# Patient Record
Sex: FEMALE | Race: WHITE | NOT HISPANIC OR LATINO | URBAN - METROPOLITAN AREA
[De-identification: names, ages, dates, MRNs, and addresses within clinical notes are randomized per-mention and may not be internally consistent; named-entity substitution may affect disease eponyms.]

---

## 2024-02-02 ENCOUNTER — INPATIENT (INPATIENT)
Facility: HOSPITAL | Age: 74
LOS: 4 days | Discharge: HOME | DRG: 313 | End: 2024-02-07
Attending: INTERNAL MEDICINE | Admitting: INTERNAL MEDICINE
Payer: MEDICARE

## 2024-02-02 VITALS
WEIGHT: 210.1 LBS | DIASTOLIC BLOOD PRESSURE: 78 MMHG | SYSTOLIC BLOOD PRESSURE: 134 MMHG | OXYGEN SATURATION: 97 % | RESPIRATION RATE: 20 BRPM | HEART RATE: 100 BPM | TEMPERATURE: 98 F

## 2024-02-02 DIAGNOSIS — R07.9 CHEST PAIN, UNSPECIFIED: ICD-10-CM

## 2024-02-02 DIAGNOSIS — D64.9 ANEMIA, UNSPECIFIED: ICD-10-CM

## 2024-02-02 DIAGNOSIS — I48.20 CHRONIC ATRIAL FIBRILLATION, UNSPECIFIED: ICD-10-CM

## 2024-02-02 DIAGNOSIS — E78.5 HYPERLIPIDEMIA, UNSPECIFIED: ICD-10-CM

## 2024-02-02 DIAGNOSIS — Z78.9 OTHER SPECIFIED HEALTH STATUS: ICD-10-CM

## 2024-02-02 LAB
ALBUMIN SERPL ELPH-MCNC: 4 G/DL — SIGNIFICANT CHANGE UP (ref 3.3–5)
ALP SERPL-CCNC: 91 U/L — SIGNIFICANT CHANGE UP (ref 40–120)
ALT FLD-CCNC: 38 U/L — SIGNIFICANT CHANGE UP (ref 10–45)
ANION GAP SERPL CALC-SCNC: 10 MMOL/L — SIGNIFICANT CHANGE UP (ref 5–17)
AST SERPL-CCNC: 65 U/L — HIGH (ref 10–40)
BASOPHILS # BLD AUTO: 0.05 K/UL — SIGNIFICANT CHANGE UP (ref 0–0.2)
BASOPHILS NFR BLD AUTO: 0.8 % — SIGNIFICANT CHANGE UP (ref 0–2)
BILIRUB SERPL-MCNC: 1 MG/DL — SIGNIFICANT CHANGE UP (ref 0.2–1.2)
BUN SERPL-MCNC: 9 MG/DL — SIGNIFICANT CHANGE UP (ref 7–23)
CALCIUM SERPL-MCNC: 9.4 MG/DL — SIGNIFICANT CHANGE UP (ref 8.4–10.5)
CHLORIDE SERPL-SCNC: 101 MMOL/L — SIGNIFICANT CHANGE UP (ref 96–108)
CO2 SERPL-SCNC: 23 MMOL/L — SIGNIFICANT CHANGE UP (ref 22–31)
CREAT SERPL-MCNC: 0.75 MG/DL — SIGNIFICANT CHANGE UP (ref 0.5–1.3)
D DIMER BLD IA.RAPID-MCNC: 266 NG/ML DDU — HIGH
EGFR: 83 ML/MIN/1.73M2 — SIGNIFICANT CHANGE UP
EOSINOPHIL # BLD AUTO: 0.22 K/UL — SIGNIFICANT CHANGE UP (ref 0–0.5)
EOSINOPHIL NFR BLD AUTO: 3.3 % — SIGNIFICANT CHANGE UP (ref 0–6)
ETHANOL SERPL-MCNC: <10 MG/DL — SIGNIFICANT CHANGE UP (ref 0–10)
GLUCOSE SERPL-MCNC: 109 MG/DL — HIGH (ref 70–99)
HCT VFR BLD CALC: 33.3 % — LOW (ref 34.5–45)
HGB BLD-MCNC: 11.2 G/DL — LOW (ref 11.5–15.5)
IMM GRANULOCYTES NFR BLD AUTO: 0.3 % — SIGNIFICANT CHANGE UP (ref 0–0.9)
LYMPHOCYTES # BLD AUTO: 0.78 K/UL — LOW (ref 1–3.3)
LYMPHOCYTES # BLD AUTO: 11.9 % — LOW (ref 13–44)
MAGNESIUM SERPL-MCNC: 1.8 MG/DL — SIGNIFICANT CHANGE UP (ref 1.6–2.6)
MCHC RBC-ENTMCNC: 33.6 GM/DL — SIGNIFICANT CHANGE UP (ref 32–36)
MCHC RBC-ENTMCNC: 33.8 PG — SIGNIFICANT CHANGE UP (ref 27–34)
MCV RBC AUTO: 100.6 FL — HIGH (ref 80–100)
MONOCYTES # BLD AUTO: 0.68 K/UL — SIGNIFICANT CHANGE UP (ref 0–0.9)
MONOCYTES NFR BLD AUTO: 10.3 % — SIGNIFICANT CHANGE UP (ref 2–14)
NEUTROPHILS # BLD AUTO: 4.83 K/UL — SIGNIFICANT CHANGE UP (ref 1.8–7.4)
NEUTROPHILS NFR BLD AUTO: 73.4 % — SIGNIFICANT CHANGE UP (ref 43–77)
NRBC # BLD: 0 /100 WBCS — SIGNIFICANT CHANGE UP (ref 0–0)
NT-PROBNP SERPL-SCNC: 1459 PG/ML — HIGH (ref 0–300)
PLATELET # BLD AUTO: 191 K/UL — SIGNIFICANT CHANGE UP (ref 150–400)
POTASSIUM SERPL-MCNC: 4.3 MMOL/L — SIGNIFICANT CHANGE UP (ref 3.5–5.3)
POTASSIUM SERPL-SCNC: 4.3 MMOL/L — SIGNIFICANT CHANGE UP (ref 3.5–5.3)
PROT SERPL-MCNC: 6.8 G/DL — SIGNIFICANT CHANGE UP (ref 6–8.3)
RBC # BLD: 3.31 M/UL — LOW (ref 3.8–5.2)
RBC # FLD: 13.5 % — SIGNIFICANT CHANGE UP (ref 10.3–14.5)
SODIUM SERPL-SCNC: 134 MMOL/L — LOW (ref 135–145)
TROPONIN T, HIGH SENSITIVITY RESULT: 6 NG/L — SIGNIFICANT CHANGE UP (ref 0–51)
TROPONIN T, HIGH SENSITIVITY RESULT: <6 NG/L — SIGNIFICANT CHANGE UP (ref 0–51)
WBC # BLD: 6.58 K/UL — SIGNIFICANT CHANGE UP (ref 3.8–10.5)
WBC # FLD AUTO: 6.58 K/UL — SIGNIFICANT CHANGE UP (ref 3.8–10.5)

## 2024-02-02 PROCEDURE — 93010 ELECTROCARDIOGRAM REPORT: CPT

## 2024-02-02 PROCEDURE — 71045 X-RAY EXAM CHEST 1 VIEW: CPT | Mod: 26

## 2024-02-02 PROCEDURE — 99285 EMERGENCY DEPT VISIT HI MDM: CPT

## 2024-02-02 RX ORDER — MESALAMINE 400 MG
1200 TABLET, DELAYED RELEASE (ENTERIC COATED) ORAL EVERY 12 HOURS
Refills: 0 | Status: DISCONTINUED | OUTPATIENT
Start: 2024-02-03 | End: 2024-02-07

## 2024-02-02 RX ORDER — APIXABAN 2.5 MG/1
5 TABLET, FILM COATED ORAL EVERY 12 HOURS
Refills: 0 | Status: DISCONTINUED | OUTPATIENT
Start: 2024-02-02 | End: 2024-02-03

## 2024-02-02 RX ORDER — GABAPENTIN 400 MG/1
300 CAPSULE ORAL THREE TIMES A DAY
Refills: 0 | Status: DISCONTINUED | OUTPATIENT
Start: 2024-02-02 | End: 2024-02-07

## 2024-02-02 RX ORDER — MESALAMINE 400 MG
2 TABLET, DELAYED RELEASE (ENTERIC COATED) ORAL
Refills: 0 | DISCHARGE

## 2024-02-02 RX ORDER — GABAPENTIN 400 MG/1
1 CAPSULE ORAL
Refills: 0 | DISCHARGE

## 2024-02-02 RX ORDER — LEVOTHYROXINE SODIUM 125 MCG
112 TABLET ORAL DAILY
Refills: 0 | Status: DISCONTINUED | OUTPATIENT
Start: 2024-02-02 | End: 2024-02-07

## 2024-02-02 RX ORDER — SUMATRIPTAN SUCCINATE 4 MG/.5ML
1 INJECTION, SOLUTION SUBCUTANEOUS
Refills: 0 | DISCHARGE

## 2024-02-02 RX ORDER — SUMATRIPTAN SUCCINATE 4 MG/.5ML
50 INJECTION, SOLUTION SUBCUTANEOUS DAILY
Refills: 0 | Status: DISCONTINUED | OUTPATIENT
Start: 2024-02-02 | End: 2024-02-07

## 2024-02-02 RX ORDER — ATORVASTATIN CALCIUM 80 MG/1
1 TABLET, FILM COATED ORAL
Refills: 0 | DISCHARGE

## 2024-02-02 RX ORDER — ZOLPIDEM TARTRATE 10 MG/1
1 TABLET ORAL
Refills: 0 | DISCHARGE

## 2024-02-02 RX ORDER — ASPIRIN/CALCIUM CARB/MAGNESIUM 324 MG
324 TABLET ORAL ONCE
Refills: 0 | Status: COMPLETED | OUTPATIENT
Start: 2024-02-02 | End: 2024-02-02

## 2024-02-02 RX ORDER — LEVOTHYROXINE SODIUM 125 MCG
1 TABLET ORAL
Refills: 0 | DISCHARGE

## 2024-02-02 RX ORDER — METOPROLOL TARTRATE 50 MG
25 TABLET ORAL DAILY
Refills: 0 | Status: DISCONTINUED | OUTPATIENT
Start: 2024-02-03 | End: 2024-02-03

## 2024-02-02 RX ORDER — ZOLPIDEM TARTRATE 10 MG/1
5 TABLET ORAL AT BEDTIME
Refills: 0 | Status: DISCONTINUED | OUTPATIENT
Start: 2024-02-02 | End: 2024-02-07

## 2024-02-02 RX ORDER — PANTOPRAZOLE SODIUM 20 MG/1
40 TABLET, DELAYED RELEASE ORAL
Refills: 0 | Status: DISCONTINUED | OUTPATIENT
Start: 2024-02-02 | End: 2024-02-07

## 2024-02-02 RX ORDER — BUPROPION HYDROCHLORIDE 150 MG/1
300 TABLET, EXTENDED RELEASE ORAL DAILY
Refills: 0 | Status: DISCONTINUED | OUTPATIENT
Start: 2024-02-02 | End: 2024-02-07

## 2024-02-02 RX ORDER — ATORVASTATIN CALCIUM 80 MG/1
40 TABLET, FILM COATED ORAL AT BEDTIME
Refills: 0 | Status: DISCONTINUED | OUTPATIENT
Start: 2024-02-02 | End: 2024-02-07

## 2024-02-02 RX ORDER — METOPROLOL TARTRATE 50 MG
25 TABLET ORAL ONCE
Refills: 0 | Status: COMPLETED | OUTPATIENT
Start: 2024-02-02 | End: 2024-02-02

## 2024-02-02 RX ORDER — CITALOPRAM 10 MG/1
40 TABLET, FILM COATED ORAL DAILY
Refills: 0 | Status: DISCONTINUED | OUTPATIENT
Start: 2024-02-02 | End: 2024-02-07

## 2024-02-02 RX ADMIN — Medication 25 MILLIGRAM(S): at 20:28

## 2024-02-02 RX ADMIN — Medication 324 MILLIGRAM(S): at 20:28

## 2024-02-02 NOTE — H&P ADULT - HISTORY OF PRESENT ILLNESS
73-year-old female patient with PMHx AUD, Leung's Esophagus, Colitis, Afib (on Eliquis), spinal stenosis, who presented to [facility] reporting two days of shortness of rbeath. Patient reports that she chronically experiences mild exertional SOB which has become progressively worse over the last two days and associated with exertional chest pressure [ qualify ] that is alleviated with rest. Patient is pain-free at this time. Patient denies CP, SOB, dizziness, palpitations, orthopnea/PND, leg swelling, LOC, bleeding, melena/hematochezia, fever, chills, URI symptoms, or recent illness.  73-year-old female patient with PMHx AUD, Leung's Esophagus, Colitis, Afib (on Eliquis), spinal stenosis, who presented to [facility] reporting two days of shortness of rbeath. Patient reports that she chronically experiences mild exertional SOB which has become progressively worse over the last two days and associated with exertional chest pressure [ qualify ] that is alleviated with rest. Patient is pain-free at this time. Patient denies CP, SOB, dizziness, palpitations, orthopnea/PND, leg swelling, LOC, bleeding, melena/hematochezia, fever, chills, URI symptoms, recent illness, recent ill contacts, recent prolonged travel,  73-year-old female patient with PMHx Alcohol Use Disorder (no hx hospitalizations/withdrawal), Leung's Esophagus, Colitis, Afib (on Eliquis), spinal stenosis, who presented to [facility] reporting two days of shortness of breath. Patient reports that she chronically experiences mild exertional SOB which has become progressively worse over the last two days and associated with exertional chest pressure [ qualify ] that is alleviated with rest. Patient is pain-free at this time. Patient denies CP, SOB, dizziness, palpitations, orthopnea/PND, leg swelling, LOC, bleeding, melena/hematochezia, fever, chills, URI symptoms, recent illness, recent ill contacts, recent prolonged travel, personal/family history of blood clots/clotting disorders, hx recent trauma/hospitalization/surgery, use of hormonal replacement therapy.    ED Course:   - Vital Signs: Temperature 97.9 F,  bpm, /78 mmHg, RR 20 breaths/min, spO2 97% on room air  - Labs: WBC 6.58, H/H 11.2/33.3, , Trops 6>6, Na 134, K 4.3, BUN/Cr 9/0.75, Glu 109, Mg 1.8, d-dimer 266  - Diagnostics: EKG (2/2/24): atrial fibrillation at 100 bpm; CXR (2/2/24): trace b/l pleural effusions versus thickening  - Interventions:  mg POx1, Lopressor 25 mg POx1    73-year-old female patient with PMHx Alcohol Use Disorder (no hx hospitalizations/withdrawal), Leung's Esophagus, Colitis, Afib (on Eliquis), spinal stenosis, who presented to [facility] reporting two days of shortness of breath. Patient reports that she chronically experiences mild exertional SOB. Two days ago she was walking her dog as usual when she began experiencing shortness of breath while walking her dog that resolved with rest. Patient reports that last night while walking her dog she again felt short of breath with associated midsternal, constant chest pressure lasting a few hours and resolving with rest. Patient reports that today symptoms had resolved but the episode concerned her, prompting her to report to ED for evaluation. Patient is pain-free at this time. Patient denies CP, SOB, dizziness, palpitations, orthopnea/PND, leg swelling, LOC, bleeding, melena/hematochezia, fever, chills, URI symptoms, recent illness, recent ill contacts, recent prolonged travel, personal/family history of blood clots/clotting disorders, hx recent trauma/hospitalization/surgery, use of hormonal replacement therapy.    ED Course:   - Vital Signs: Temperature 97.9 F,  bpm, /78 mmHg, RR 20 breaths/min, spO2 97% on room air  - Labs: WBC 6.58, H/H 11.2/33.3, , Trops 6>6, Na 134, K 4.3, BUN/Cr 9/0.75, Glu 109, Mg 1.8, d-dimer 266  - Diagnostics: EKG (2/2/24): atrial fibrillation at 100 bpm; CXR (2/2/24): trace b/l pleural effusions versus thickening  - Interventions:  mg POx1, Lopressor 25 mg POx1    73-year-old female patient with PMHx Alcohol Use Disorder (no hx hospitalizations/withdrawal), Leung's Esophagus, Ulcerative Colitis (on Mesalamine), Afib (on Eliquis), spinal stenosis, migraine headaches, who presented to [facility] reporting two days of shortness of breath. Patient reports that she chronically experiences mild exertional SOB. Two days ago she was walking her dog as usual when she began experiencing shortness of breath while walking her dog that resolved with rest. Patient reports that last night while walking her dog she again felt short of breath with associated midsternal, constant chest pressure lasting a few hours and resolving with rest. Patient reports that today symptoms had resolved but the episode concerned her, prompting her to report to ED for evaluation. Patient is pain-free at this time. Patient denies CP, SOB, dizziness, palpitations, orthopnea/PND, leg swelling, LOC, bleeding, melena/hematochezia, fever, chills, URI symptoms, recent illness, recent ill contacts, recent prolonged travel, personal/family history of blood clots/clotting disorders, hx recent trauma/hospitalization/surgery, use of hormonal replacement therapy.    ED Course:   - Vital Signs: Temperature 97.9 F,  bpm, /78 mmHg, RR 20 breaths/min, spO2 97% on room air  - Labs: WBC 6.58, H/H 11.2/33.3, , Trops 6>6, Na 134, K 4.3, BUN/Cr 9/0.75, Glu 109, Mg 1.8, d-dimer 266  - Diagnostics: EKG (2/2/24): atrial fibrillation at 100 bpm; CXR (2/2/24): trace b/l pleural effusions versus thickening  - Interventions:  mg POx1, Lopressor 25 mg POx1    73-year-old female patient with PMHx daily alcohol use (no hx hospitalizations/withdrawal), Leung's Esophagus, Colitis (on Mesalamine), Afib (on Eliquis), spinal stenosis, migraine headaches, who presented to [facility] reporting two days of shortness of breath. Patient reports that she chronically experiences mild exertional SOB. Two days ago she was walking her dog as usual when she began experiencing shortness of breath while walking her dog that resolved with rest. Patient reports that last night while walking her dog she again felt short of breath with associated midsternal, constant chest pressure lasting a few hours and resolving with rest. Patient reports that today symptoms had resolved but the episode concerned her, prompting her to report to ED for evaluation. Patient is pain-free at this time. Patient denies CP, SOB, dizziness, palpitations, orthopnea/PND, leg swelling, LOC, bleeding, melena/hematochezia, fever, chills, URI symptoms, recent illness, recent ill contacts, recent prolonged travel, personal/family history of blood clots/clotting disorders, hx recent trauma/hospitalization/surgery, use of hormonal replacement therapy.    ED Course:   - Vital Signs: Temperature 97.9 F,  bpm, /78 mmHg, RR 20 breaths/min, spO2 97% on room air  - Labs: WBC 6.58, H/H 11.2/33.3, , Trops 6>6, Na 134, K 4.3, BUN/Cr 9/0.75, Glu 109, Mg 1.8, d-dimer 266  - Diagnostics: EKG (2/2/24): atrial fibrillation at 100 bpm; CXR (2/2/24): trace b/l pleural effusions versus thickening  - Interventions:  mg POx1, Lopressor 25 mg POx1    73-year-old female patient with PMHx daily alcohol use (no hx hospitalizations/withdrawal), Leung's Esophagus, Colitis (on Mesalamine), Afib (on Eliquis), spinal stenosis, migraine headaches, who presented to [facility] reporting two days of shortness of breath. Two days ago she was walking her dog as usual when she began experiencing shortness of breath which resolved with rest. Patient reports that last night while walking her dog she again felt short of breath with associated midsternal, constant chest pressure that also resolved with rest. Patient reports that today symptoms had resolved but the episode concerned her, prompting her to report to ED for evaluation. Patient is pain-free at this time and feels as though "[she] could get up and walk out." Patient denies current CP, current SOB, dizziness, palpitations, orthopnea/PND, leg swelling, LOC, bleeding, melena/hematochezia, fever, chills, URI symptoms, recent illness, recent ill contacts, recent prolonged travel, personal/family history of blood clots/clotting disorders, hx recent trauma/hospitalization/surgery, use of hormonal replacement therapy, or family hx of early CAD/sudden cardiac death. Patient denies sedentary lifestyle, states that she walks dog three times per day.    ED Course:   - Vital Signs: Temperature 97.9 F,  bpm, /78 mmHg, RR 20 breaths/min, spO2 97% on room air  - Labs: WBC 6.58, H/H 11.2/33.3, , Trops 6>6, Na 134, K 4.3, BUN/Cr 9/0.75, Glu 109, Mg 1.8, d-dimer 266  - Diagnostics: EKG (2/2/24): atrial fibrillation at 100 bpm; CXR (2/2/24): trace b/l pleural effusions versus thickening  - Interventions:  mg POx1, Lopressor 25 mg POx1  73-year-old female patient with PMHx daily alcohol use (no hx hospitalizations/withdrawal), Leung's Esophagus, Colitis (on Mesalamine), Afib (on Eliquis), spinal stenosis, migraine headaches, anxiety/depression, who presented to [facility] reporting two days of shortness of breath. Two days ago she was walking her dog as usual when she began experiencing shortness of breath which resolved with rest. Patient reports that last night while walking her dog she again felt short of breath with associated midsternal, constant chest pressure that also resolved with rest. Patient reports that today symptoms had resolved but the episode concerned her, prompting her to report to ED for evaluation. Patient is pain-free at this time and feels as though "[she] could get up and walk out." Patient denies current CP, current SOB, dizziness, palpitations, orthopnea/PND, leg swelling, LOC, bleeding, melena/hematochezia, fever, chills, URI symptoms, recent illness, recent ill contacts, recent prolonged travel, personal/family history of blood clots/clotting disorders, hx recent trauma/hospitalization/surgery, use of hormonal replacement therapy, or family hx of early CAD/sudden cardiac death. Patient denies sedentary lifestyle, states that she walks dog three times per day.    ED Course:   - Vital Signs: Temperature 97.9 F,  bpm, /78 mmHg, RR 20 breaths/min, spO2 97% on room air  - Labs: WBC 6.58, H/H 11.2/33.3, , Trops 6>6, Na 134, K 4.3, BUN/Cr 9/0.75, Glu 109, Mg 1.8, d-dimer 266  - Diagnostics: EKG (2/2/24): atrial fibrillation at 100 bpm; CXR (2/2/24): trace b/l pleural effusions versus thickening  - Interventions:  mg POx1, Lopressor 25 mg POx1  73-year-old female patient with PMHx daily alcohol use (no hx hospitalizations/withdrawal), Leung's Esophagus, Colitis (on Mesalamine), Afib (on Eliquis), spinal stenosis, migraine headaches, anxiety/depression, who presented to [facility] reporting two days of shortness of breath. Two days ago she was walking her dog as usual when she began experiencing shortness of breath which resolved with rest. Patient reports that last night while walking her dog she again felt short of breath with associated midsternal, constant chest pressure that also resolved with rest. Patient reports that today symptoms had resolved but the episode concerned her, prompting her to report to ED for evaluation. Patient is pain-free at this time and feels as though "[she] could get up and walk out." Patient denies current CP, current SOB, dizziness, palpitations, orthopnea/PND, leg swelling, LOC, bleeding, melena/hematochezia, fever, chills, URI symptoms, recent illness, recent ill contacts, recent prolonged travel, personal/family history of blood clots/clotting disorders, hx recent trauma/hospitalization/surgery, use of hormonal replacement therapy, or family hx of early CAD/sudden cardiac death. Patient denies sedentary lifestyle, states that she walks dog three times per day.    ED Course:   - Vital Signs: Temperature 97.9 F,  bpm, /78 mmHg, RR 20 breaths/min, spO2 97% on room air  - Labs: WBC 6.58, H/H 11.2/33.3, , Trops 6>6, Na 134, K 4.3, BUN/Cr 9/0.75, Glu 109, Mg 1.8, d-dimer 266  - Diagnostics: EKG (2/2/24): atrial fibrillation at 100 bpm; CXR (2/2/24): trace b/l pleural effusions versus thickening  - Interventions:  mg POx1, Lopressor 25 mg POx1  73-year-old female patient with PMHx daily alcohol use (no hx hospitalizations/withdrawal), Leung's Esophagus, Colitis (on Mesalamine), Afib (on Eliquis), spinal stenosis, migraine headaches, anxiety/depression, anemia (unknown baseline hgb), who presented to [facility] reporting two days of shortness of breath. Two days ago she was walking her dog as usual when she began experiencing shortness of breath which resolved with rest. Patient reports that last night while walking her dog she again felt short of breath with associated midsternal, constant chest pressure that also resolved with rest. Patient reports that today symptoms had resolved but the episode concerned her, prompting her to report to ED for evaluation. Patient is pain-free at this time and feels as though "[she] could get up and walk out." Patient denies current CP, current SOB, dizziness, palpitations, orthopnea/PND, leg swelling, LOC, bleeding, melena/hematochezia, fever, chills, URI symptoms, recent illness, recent ill contacts, recent prolonged travel, personal/family history of blood clots/clotting disorders, hx recent trauma/hospitalization/surgery, use of hormonal replacement therapy, or family hx of early CAD/sudden cardiac death. Patient denies sedentary lifestyle, states that she walks dog three times per day.    ED Course:   - Vital Signs: Temperature 97.9 F,  bpm, /78 mmHg, RR 20 breaths/min, spO2 97% on room air  - Labs: WBC 6.58, H/H 11.2/33.3, , Trops 6>6, Na 134, K 4.3, BUN/Cr 9/0.75, Glu 109, Mg 1.8, d-dimer 266  - Diagnostics: EKG (2/2/24): atrial fibrillation at 100 bpm; CXR (2/2/24): trace b/l pleural effusions versus thickening  - Interventions:  mg POx1, Lopressor 25 mg POx1  73-year-old female patient with PMHx daily alcohol use (no hx hospitalizations/withdrawal), Leung's Esophagus, Colitis (on Mesalamine), Afib (on Eliquis), spinal stenosis, migraine headaches, anxiety/depression, anemia (unknown baseline hgb, required transfusion 2015), who presented to [facility] reporting two days of shortness of breath. Two days ago she was walking her dog as usual when she began experiencing shortness of breath which resolved with rest. Patient reports that last night while walking her dog she again felt short of breath with associated midsternal, constant chest pressure that also resolved with rest. Patient reports that today symptoms had resolved but the episode concerned her, prompting her to report to ED for evaluation. Patient is pain-free at this time and feels as though "[she] could get up and walk out." Patient denies current CP, current SOB, dizziness, palpitations, orthopnea/PND, leg swelling, LOC, bleeding, melena/hematochezia, fever, chills, URI symptoms, recent illness, recent ill contacts, recent prolonged travel, personal/family history of blood clots/clotting disorders, hx recent trauma/hospitalization/surgery, use of hormonal replacement therapy, or family hx of early CAD/sudden cardiac death. Patient denies sedentary lifestyle, states that she walks dog three times per day.    ED Course:   - Vital Signs: Temperature 97.9 F,  bpm, /78 mmHg, RR 20 breaths/min, spO2 97% on room air  - Labs: WBC 6.58, H/H 11.2/33.3, , Trops 6>6, Na 134, K 4.3, BUN/Cr 9/0.75, Glu 109, Mg 1.8, d-dimer 266  - Diagnostics: EKG (2/2/24): atrial fibrillation at 100 bpm; CXR (2/2/24): trace b/l pleural effusions versus thickening  - Interventions:  mg POx1, Lopressor 25 mg POx1  73-year-old female patient with PMHx daily alcohol use (no hx hospitalizations/withdrawal), Leung's Esophagus, Colitis (on Mesalamine), Afib (on Eliquis), spinal stenosis, migraine headaches, anxiety/depression, anemia (unknown baseline hgb, required transfusion 2015), who presented to [facility] reporting two days of shortness of breath. Two days ago she was walking her dog as usual when she began experiencing shortness of breath which resolved with rest. Patient reports that last night while walking her dog she again felt short of breath with associated midsternal, constant chest pressure that also resolved with rest. Patient reports that today symptoms had resolved but the episode concerned her, prompting her to report to ED for evaluation. Patient is pain-free at this time and feels as though "[she] could get up and walk out." Patient denies current CP, current SOB, dizziness, palpitations, orthopnea/PND, leg swelling, LOC, bleeding, melena/hematochezia, fever, chills, URI symptoms, recent illness, recent ill contacts, recent prolonged travel, personal/family history of blood clots/clotting disorders, hx recent trauma/hospitalization/surgery, use of hormonal replacement therapy, or family hx of early CAD/sudden cardiac death. Patient denies sedentary lifestyle, states that she walks dog three times per day. Follows with cardiologist regularly; stress test within last two months reportedly normal per pt.    ED Course:   - Vital Signs: Temperature 97.9 F,  bpm, /78 mmHg, RR 20 breaths/min, spO2 97% on room air  - Labs: WBC 6.58, H/H 11.2/33.3, , Trops 6>6, Na 134, K 4.3, BUN/Cr 9/0.75, Glu 109, Mg 1.8, d-dimer 266  - Diagnostics: EKG (2/2/24): atrial fibrillation at 100 bpm; CXR (2/2/24): trace b/l pleural effusions versus pleural thickening  - Interventions:  mg POx1, Lopressor 25 mg POx1

## 2024-02-02 NOTE — H&P ADULT - PROBLEM SELECTOR PLAN 3
H/H 11.2/33.3, previous admission w/ anemia requiring transfusion, denies bleeding including black or tarry stools/epistaxis/hematuria  - Pt reports last colonoscopy 4/2023, reportedly polyps found  - Follow up B12, folate, iron studies in AM   - Maintain active T&S H/H 11.2/33.3, previous admission w/ anemia requiring transfusion, denies bleeding including black or tarry stools/epistaxis/hematuria; pt reports last colonoscopy 4/2023, reportedly polyps found  - Follow up B12, folate, iron studies in AM   - Maintain active T&S H/H 11.2/33.3 (unknown baseline), previous admission w/ anemia requiring transfusion, denies bleeding including black or tarry stools/epistaxis/hematuria; pt reports last colonoscopy 4/2023, reportedly polyps found  - Follow up B12, folate, iron studies in AM   - Maintain active T&S

## 2024-02-02 NOTE — H&P ADULT - CARDIOVASCULAR COMMENTS
1+ pretibial pitting edema b/l lower extremities (chronic per pt) 2+ pretibial pitting edema b/l lower extremities (chronic per pt; b/l LE of equal size) 2+ pitting edema to mid-shin b/l lower extremities (chronic per pt; b/l LE of equal size)

## 2024-02-02 NOTE — H&P ADULT - PROBLEM SELECTOR PLAN 2
Patient reports hx Afib on Eliquis (endorses adherence), currently in AF in 100s  - Anticoagulation: Eliquis 5 mg BID  - Rate-control: ____Toprol XL 25 mg QD___ (s/p Lopressor 25 mg POx1 in ED) Patient reports hx Afib on Eliquis (endorses adherence), currently in AF in 100s  - Anticoagulation: Eliquis 5 mg BID   - Rate-control: Toprol XL 25 mg QD Patient reports hx Afib on Eliquis (endorses adherence), currently in AF in 70s  - Anticoagulation: Eliquis 5 mg BID   - Rate-control: Toprol XL 25 mg QD Patient reports recently dx with Afib on Eliquis (endorses adherence), currently in AF, HR 70s  - Anticoagulation: Eliquis 5 mg BID   - Rate-control: Toprol XL 25 mg QD

## 2024-02-02 NOTE — H&P ADULT - PROBLEM SELECTOR PLAN 7
- Continue: Sumatriptan 50 mg QD PRN     #Insomnia  - Continue: Zolpidem 10 mg QHS Pt reports hx spinal stenosis, 5/5 strength b/l LE, sensation grossly intact   - Continue: Gabapentin 300 mg TID   - HOLD: Naproxen 250 mg BID Pt reports hx colitis (unknown type)  - Continue: Mesalamine 1.2 g BID    #Leung's Esophagus   Pt denies black/tarry stools or further signs of bleeding  - Continue: pantoprazole 40 mg QD

## 2024-02-02 NOTE — H&P ADULT - PROBLEM SELECTOR PLAN 1
Patient p/w exertional SOB and CP x2 days, currently CP-free  - EKG (2/2/24): atrial fibrillation @100 bpm  - CXR (2/2/24): trace b/l effusions versus thickening  - Trops 6>6, pending CK/CKMB  - Continue: telemetry monitoring, continuous pulse oximetry, VS q4h Patient p/w exertional SOB and CP x2 days, currently CP-free  - EKG (2/2/24): atrial fibrillation @100 bpm  - CXR (2/2/24): trace b/l effusions versus pleural thickening  - Trops 6>6, follow up CK/CKMB  - Continue: telemetry monitoring, continuous pulse oximetry, VS q4h Patient p/w exertional SOB and CP x2 days, currently CP-free, VSS   - EKG (2/2/24): atrial fibrillation @100 bpm  - CXR (2/2/24): trace b/l effusions versus pleural thickening  - Trops 6>6, follow up CK/CKMB  - D-dimer 266; per [facility] Age-Adjusted cut-off pt low risk for VTE  - Continue: telemetry monitoring, continuous pulse oximetry, VS q4h Patient p/w exertional SOB and CP x2 days, currently CP-free, VSS   - EKG (2/2/24): atrial fibrillation @100 bpm  - CXR (2/2/24): trace b/l effusions versus pleural thickening  - Trops 6>6, follow up CK/CKMB  - D-dimer 266; per MD-Calc Age-Adjusted cut-off pt low risk for VTE  - Continue: telemetry monitoring, continuous pulse oximetry, VS q4h Patient p/w exertional SOB and CP x2 days, currently CP-free, VSS, w/ 2+ b/l LE edema   - EKG (2/2/24): atrial fibrillation @100 bpm  - CXR (2/2/24): trace b/l effusions versus pleural thickening  - Trops 6>6, follow up CK/CKMB, BNP 1459  - D-dimer 266; per MD-Calc Age-Adjusted cut-off pt low risk for VTE  - s/p Lasix 20 mg IVP x1 (lasix-naiive)  - Follow up: b/l LE US  - Continue: telemetry monitoring, continuous pulse oximetry, VS q4h, strict I&Os Patient p/w exertional SOB and CP x2 days, currently CP-free, VSS, w/ 2+ b/l LE edema   - EKG (2/2/24): atrial fibrillation @100 bpm  - CXR (2/2/24): trace b/l effusions versus pleural thickening  - Trops 6>6, CK/CKMB WNL, BNP 1459  - D-dimer 266; per MD-Calc Age-Adjusted cut-off pt low risk for VTE  - s/p Lasix 20 mg IVP x1 (lasix-naiive)  - Follow up: b/l LE US  - Continue: telemetry monitoring, continuous pulse oximetry, VS q4h, strict I&Os

## 2024-02-02 NOTE — H&P ADULT - PROBLEM SELECTOR PLAN 4
Follow up lipid panel in AM   - Continue: atorvastatin 40 mg QHS (_home med____)     DVT ppx: Eliquis 5 mg BID  F: tolerating PO  E: Keep K > 4, Mg > 2  N: DASH/TLC w/ CC     Code: Full  Dispo: pending clinical progression Follow up lipid panel in AM   - Continue: Atorvastatin 40 mg QHS     DVT ppx: Eliquis 5 mg BID  F: tolerating PO  E: Keep K > 4, Mg > 2  N: DASH/TLC w/ CC     Code: Full  Dispo: pending clinical progression Pt states drinks appx 3 glasses of wine/day, last drink > 24 hours ago, denies hx hospitalizations/withdrawal   - CIWA protocol Pt states drinks appx 3 glasses of wine/day, last drink > 24 hours ago, denies hx hospitalizations/withdrawal   - CIWA protocol (CIWA Score = 2 on admission)

## 2024-02-02 NOTE — ED ADULT NURSE NOTE - OBJECTIVE STATEMENT
Pt c/o chest pressure and SOB worse with exertion. pt denies any cough, CP, N/V/D, fever, or chills.

## 2024-02-02 NOTE — ED PROVIDER NOTE - NSICDXPASTMEDICALHX_GEN_ALL_CORE_FT
PAST MEDICAL HISTORY:  Alcohol abuse     Anxiety     Leung esophagus     Chronic atrial fibrillation     Colitis     Depression

## 2024-02-02 NOTE — H&P ADULT - NSHPLABSRESULTS_GEN_ALL_CORE
LABS:                          11.2   6.58  )-----------( 191      ( 02 Feb 2024 16:27 )             33.3     02-02    134<L>  |  101  |  9   ----------------------------<  109<H>  4.3   |  23  |  0.75    Ca    9.4      02 Feb 2024 16:27  Mg     1.8     02-02    TPro  6.8  /  Alb  4.0  /  TBili  1.0  /  DBili  x   /  AST  65<H>  /  ALT  38  /  AlkPhos  91  02-02    LIVER FUNCTIONS - ( 02 Feb 2024 16:27 )  Alb: 4.0 g/dL / Pro: 6.8 g/dL / ALK PHOS: 91 U/L / ALT: 38 U/L / AST: 65 U/L / GGT: x             Urinalysis Basic - ( 02 Feb 2024 16:27 )    Color: x / Appearance: x / SG: x / pH: x  Gluc: 109 mg/dL / Ketone: x  / Bili: x / Urobili: x   Blood: x / Protein: x / Nitrite: x   Leuk Esterase: x / RBC: x / WBC x   Sq Epi: x / Non Sq Epi: x / Bacteria: x

## 2024-02-02 NOTE — ED ADULT NURSE NOTE - NSFALLUNIVINTERV_ED_ALL_ED
Bed/Stretcher in lowest position, wheels locked, appropriate side rails in place/Call bell, personal items and telephone in reach/Instruct patient to call for assistance before getting out of bed/chair/stretcher/Non-slip footwear applied when patient is off stretcher/[place] to call system/Physically safe environment - no spills, clutter or unnecessary equipment/Purposeful proactive rounding/Room/bathroom lighting operational, light cord in reach

## 2024-02-02 NOTE — H&P ADULT - PROBLEM SELECTOR PLAN 10
Follow up lipid panel in AM   - Continue: Atorvastatin 40 mg QHS     DVT ppx: Eliquis 5 mg BID  F: tolerating PO  E: Keep K > 4, Mg > 2  N: DASH/TLC w/ CC     Code: Full  Dispo: pending clinical progression

## 2024-02-02 NOTE — PATIENT PROFILE ADULT - FALL HARM RISK - HARM RISK INTERVENTIONS
Assistance with ambulation/Assistance OOB with selected safe patient handling equipment/Communicate Risk of Fall with Harm to all staff/Monitor for mental status changes/Monitor gait and stability/Reinforce activity limits and safety measures with patient and family/Tailored Fall Risk Interventions/Toileting schedule using arm’s reach rule for commode and bathroom/Use of alarms - bed, chair and/or voice tab/Visual Cue: Yellow wristband and red socks/Bed in lowest position, wheels locked, appropriate side rails in place/Call bell, personal items and telephone in reach/Instruct patient to call for assistance before getting out of bed or chair/Non-slip footwear when patient is out of bed/[place] to call system/Physically safe environment - no spills, clutter or unnecessary equipment/Purposeful Proactive Rounding/Room/bathroom lighting operational, light cord in reach

## 2024-02-02 NOTE — ED PROVIDER NOTE - ATTENDING APP SHARED VISIT CONTRIBUTION OF CARE
73-year-old with multiple medical problems coming in with acute on chronic shortness of breath with what she describes as profound exertional shortness of breath today with associated tightness and pressure on chest with exertion, this is relieved with rest although earlier was feeling a bit short of breath at rest as well which was new, no known lung problems, no smoking, no history of blood clot, no leg pain or swelling, no recent infections symptoms, no cough, no fevers, EKG is A-fib at 100, x-ray with no effusion, no infiltrate, patient with no PE risk factors and on Eliquis doubt PE, exertional chest pressure and shortness of breath concerning for possible ACS, will plan to admit to cardiology for further evaluation.  PE  lung cbta , irreg / no murmer , no acute distress, no edema / no calf tenderness

## 2024-02-02 NOTE — ED ADULT NURSE NOTE - NS ED NURSE DISCH DISPOSITION
Admitted H Plasty Text: Given the location of the defect, shape of the defect and the proximity to free margins a H-plasty was deemed most appropriate for repair.  Using a sterile surgical marker, the appropriate advancement arms of the H-plasty were drawn incorporating the defect and placing the expected incisions within the relaxed skin tension lines where possible. The area thus outlined was incised deep to adipose tissue with a #15 scalpel blade. The skin margins were undermined to an appropriate distance in all directions utilizing iris scissors.  The opposing advancement arms were then advanced into place in opposite direction and anchored with interrupted buried subcutaneous sutures.

## 2024-02-02 NOTE — H&P ADULT - PROBLEM SELECTOR PLAN 9
Maria A Sanderson was admitted to [facility] room 26 from ED via cart.   Reason for hospitalization is abdominal pain, N/V, constipation, low electrolytes. Upon arrival, patient is stable. Patient has history significant for Gerd, gastritis, SVT, DVT, HF, hernia repair.  Patient oriented to bed, call light and .  Patient provided with the following educational materials upon admission:safety and pain.   Level of understanding patient verbalized understanding.   Admission orders received at this time. See Epic documentation for patient individualized nursing care plan. Dual skin done with FATMATA Pineda.    Follow up lipid panel in AM   - Continue: Atorvastatin 40 mg QHS     DVT ppx: Eliquis 5 mg BID  F: tolerating PO  E: Keep K > 4, Mg > 2  N: DASH/TLC w/ CC     Code: Full  Dispo: pending clinical progression - Continue: Sumatriptan 50 mg QD PRN     #Insomnia  - Continue: Zolpidem 10 mg QHS

## 2024-02-02 NOTE — H&P ADULT - NS ATTEND AMEND GEN_ALL_CORE FT
73-year-old female patient with PMHx daily alcohol use (no hx hospitalizations/withdrawal), Leung's Esophagus, Colitis (on Mesalamine), Afib (on Eliquis), spinal stenosis, migraine headaches, anxiety/depression, anemia (unknown baseline hgb, required transfusion 2015), with two days of exertional SOB/chest pressure, admitted to cardiac telemetry to r/o ACS.   CP free at present    1. CP    Hx as above  atypical, likely related to GI  Trop negative Ddimer low end of profile for age   recent stress test wnl  Will obtain CCTA

## 2024-02-02 NOTE — ED PROVIDER NOTE - CLINICAL SUMMARY MEDICAL DECISION MAKING FREE TEXT BOX
74 y/o F pmhx afib, colitis, barrets esophogus, AUD comes in with 48 hours of shortness of breath. Patient denies any cough, fever, chills, chest pain, jaw or arm pain, or back pain. Patient has increased shortness of breath from baseline. Vitals stable /78, , Temp 97.9 SPO2 97. CBC shows anemia- hemoglobin 11.4. 74 y/o F pmhx afib on eloquis but no rate control, colitis, barrets esophogus, AUD comes in with 48 hours of shortness of breath. Patient denies any cough, fever, chills, chest pain, jaw or arm pain, or back pain. Patient has increased shortness of breath and increased pressure from baseline. Vitals stable /78, , Temp 97.9 SPO2 97. CBC shows slight anemia, EKG shows Afib. Patient to be admitted to [facility] for further workup 72 y/o F pmhx afib on eloquis but no rate control, colitis, barrets esophogus,  comes in with 48 hours of worsening shortness of breath and new exertional chest pressure. Patient denies any cough, fever, chills, chest pain, jaw or arm pain, or back pain. 74 y/o F pm-hx afib on eloquis but no rate control, colitis, barrets esophogus,  comes in with 48 hours of worsening shortness of breath and new exertional chest pressure. Patient denies any cough, fever, chills, chest pain, jaw or arm pain, or back pain.

## 2024-02-02 NOTE — H&P ADULT - NSICDXPASTMEDICALHX_GEN_ALL_CORE_FT
PAST MEDICAL HISTORY:  Alcohol abuse     Anxiety     Leung esophagus     Chronic atrial fibrillation     Colitis     Depression      PAST MEDICAL HISTORY:  Alcohol use     Anxiety     Leung esophagus     Chronic atrial fibrillation     Colitis     Depression     History of migraine headaches     Insomnia, unspecified

## 2024-02-02 NOTE — H&P ADULT - PROBLEM SELECTOR PLAN 8
Follow up lipid panel in AM   - Continue: Atorvastatin 40 mg QHS     DVT ppx: Eliquis 5 mg BID  F: tolerating PO  E: Keep K > 4, Mg > 2  N: DASH/TLC w/ CC     Code: Full  Dispo: pending clinical progression - Continue: Sumatriptan 50 mg QD PRN     #Insomnia  - Continue: Zolpidem 10 mg QHS Pt reports hx spinal stenosis, 5/5 strength b/l LE, sensation grossly intact   - Continue: Gabapentin 300 mg TID   - HOLD: Naproxen 250 mg BID

## 2024-02-02 NOTE — H&P ADULT - PROBLEM SELECTOR PROBLEM 9

## 2024-02-02 NOTE — H&P ADULT - PROBLEM SELECTOR PLAN 6
Pt reports hx colitis; unsure which but does not believe it is UC   - Continue: Mesalamine 1.2 g BID    #Leung's Esophagus   Pt denies black/tarry stools or further signs of bleeding   - Continue: pantoprazole 40 mg QD Pt reports hx colitis (unknown type)  - Continue: Mesalamine 1.2 g BID    #Leung's Esophagus   Pt denies black/tarry stools or further signs of bleeding  - Continue: pantoprazole 40 mg QD - Continue: Citalopram 40 mg QD and Bupropion 300 mg QD

## 2024-02-02 NOTE — H&P ADULT - ASSESSMENT
73-year-old female patient with PMHx Alcohol Use Disorder (no hx hospitalizations/withdrawal), Leung's Esophagus, Colitis, Afib (on Eliquis), spinal stenosis, with two days of exertional SOB/chest pressure, admitted to cardiac telemetry to r/o ACS. 73-year-old female patient with PMHx daily alcohol use (no hx hospitalizations/withdrawal), Leung's Esophagus, Colitis (on Mesalamine), Afib (on Eliquis), spinal stenosis, migraine headaches, anxiety/depression, anemia (unknown baseline hgb), with two days of exertional SOB/chest pressure, admitted to cardiac telemetry to r/o ACS.  73-year-old female patient with PMHx daily alcohol use (no hx hospitalizations/withdrawal), Leung's Esophagus, Colitis (on Mesalamine), Afib (on Eliquis), spinal stenosis, migraine headaches, anxiety/depression, anemia (unknown baseline hgb, required transfusion 2015), with two days of exertional SOB/chest pressure, admitted to cardiac telemetry to r/o ACS.

## 2024-02-02 NOTE — H&P ADULT - CARDIOVASCULAR
normal/regular rate and rhythm/S1 S2 present/no gallops/no rub/no murmur/no JVD/no pedal edema/vascular normal/regular rate and rhythm/S1 S2 present/no gallops/no rub/no murmur/no JVD/vascular S1 S2 present/no gallops/no rub/no murmur/no JVD/Irregularly irregular rhythm/vascular

## 2024-02-02 NOTE — H&P ADULT - SOCIAL HISTORY: ALCOHOL USE
3 glasses of wine/night 3 glasses of wine/night, no hx hospitalizations/withdrawal, last drink approximately 24 hours ago (2/1/24 at 11PM)

## 2024-02-02 NOTE — ED PROVIDER NOTE - PHYSICAL EXAMINATION
T(C): 36.6 (02-02-24 @ 15:30), Max: 36.6 (02-02-24 @ 15:30)  HR: 100 (02-02-24 @ 15:30) (100 - 100)  BP: 134/78 (02-02-24 @ 15:30) (134/78 - 134/78)  RR: 20 (02-02-24 @ 15:30) (20 - 20)  SpO2: 97% (02-02-24 @ 15:30) (97% - 97%)    CONSTITUTIONAL:  no apparent distress  EYES:  non-icteric, no pallor  ENMT: Oral mucosa with moist membranes.   RESP: No respiratory distress, no use of accessory muscles; CTA b/l, no WRR  CV: RRR, +S1S2, no MRG; no JVD; no peripheral edema  GI: Soft, NT, ND, no rebound, no guarding;

## 2024-02-02 NOTE — H&P ADULT - PROBLEM SELECTOR PLAN 5
Follow up lipid panel in AM   - Continue: Atorvastatin 40 mg QHS     DVT ppx: Eliquis 5 mg BID  F: tolerating PO  E: Keep K > 4, Mg > 2  N: DASH/TLC w/ CC     Code: Full  Dispo: pending clinical progression - Continue: Citalopram 40 mg QD and Bupropion 300 mg QD Follow up TSH in AM  - Continue: Levothyroxine 112 mcg QD

## 2024-02-03 DIAGNOSIS — E03.9 HYPOTHYROIDISM, UNSPECIFIED: ICD-10-CM

## 2024-02-03 DIAGNOSIS — K52.9 NONINFECTIVE GASTROENTERITIS AND COLITIS, UNSPECIFIED: ICD-10-CM

## 2024-02-03 DIAGNOSIS — Z86.69 PERSONAL HISTORY OF OTHER DISEASES OF THE NERVOUS SYSTEM AND SENSE ORGANS: ICD-10-CM

## 2024-02-03 DIAGNOSIS — F32.A DEPRESSION, UNSPECIFIED: ICD-10-CM

## 2024-02-03 DIAGNOSIS — M48.00 SPINAL STENOSIS, SITE UNSPECIFIED: ICD-10-CM

## 2024-02-03 LAB
A1C WITH ESTIMATED AVERAGE GLUCOSE RESULT: 5.7 % — HIGH (ref 4–5.6)
ALBUMIN SERPL ELPH-MCNC: 3.8 G/DL — SIGNIFICANT CHANGE UP (ref 3.3–5)
ALP SERPL-CCNC: 83 U/L — SIGNIFICANT CHANGE UP (ref 40–120)
ALT FLD-CCNC: 32 U/L — SIGNIFICANT CHANGE UP (ref 10–45)
ANION GAP SERPL CALC-SCNC: 10 MMOL/L — SIGNIFICANT CHANGE UP (ref 5–17)
APTT BLD: 139 SEC — CRITICAL HIGH (ref 24.5–35.6)
APTT BLD: 41.3 SEC — HIGH (ref 24.5–35.6)
AST SERPL-CCNC: 52 U/L — HIGH (ref 10–40)
BASOPHILS # BLD AUTO: 0.04 K/UL — SIGNIFICANT CHANGE UP (ref 0–0.2)
BASOPHILS NFR BLD AUTO: 0.8 % — SIGNIFICANT CHANGE UP (ref 0–2)
BILIRUB DIRECT SERPL-MCNC: 0.3 MG/DL — SIGNIFICANT CHANGE UP (ref 0–0.3)
BILIRUB INDIRECT FLD-MCNC: 0.5 MG/DL — SIGNIFICANT CHANGE UP (ref 0.2–1)
BILIRUB SERPL-MCNC: 0.8 MG/DL — SIGNIFICANT CHANGE UP (ref 0.2–1.2)
BLD GP AB SCN SERPL QL: NEGATIVE — SIGNIFICANT CHANGE UP
BUN SERPL-MCNC: 10 MG/DL — SIGNIFICANT CHANGE UP (ref 7–23)
CALCIUM SERPL-MCNC: 8.9 MG/DL — SIGNIFICANT CHANGE UP (ref 8.4–10.5)
CHLORIDE SERPL-SCNC: 102 MMOL/L — SIGNIFICANT CHANGE UP (ref 96–108)
CHOLEST SERPL-MCNC: 107 MG/DL — SIGNIFICANT CHANGE UP
CK MB CFR SERPL CALC: 3 NG/ML — SIGNIFICANT CHANGE UP (ref 0–6.7)
CK SERPL-CCNC: 127 U/L — SIGNIFICANT CHANGE UP (ref 25–170)
CO2 SERPL-SCNC: 24 MMOL/L — SIGNIFICANT CHANGE UP (ref 22–31)
CREAT SERPL-MCNC: 0.72 MG/DL — SIGNIFICANT CHANGE UP (ref 0.5–1.3)
EGFR: 88 ML/MIN/1.73M2 — SIGNIFICANT CHANGE UP
EOSINOPHIL # BLD AUTO: 0.31 K/UL — SIGNIFICANT CHANGE UP (ref 0–0.5)
EOSINOPHIL NFR BLD AUTO: 6.2 % — HIGH (ref 0–6)
ESTIMATED AVERAGE GLUCOSE: 117 MG/DL — HIGH (ref 68–114)
FERRITIN SERPL-MCNC: 471 NG/ML — HIGH (ref 13–330)
FOLATE SERPL-MCNC: >20 NG/ML — SIGNIFICANT CHANGE UP
GLUCOSE SERPL-MCNC: 108 MG/DL — HIGH (ref 70–99)
HCT VFR BLD CALC: 32.3 % — LOW (ref 34.5–45)
HCT VFR BLD CALC: 33.8 % — LOW (ref 34.5–45)
HCV AB S/CO SERPL IA: 0.04 S/CO — SIGNIFICANT CHANGE UP
HCV AB SERPL-IMP: SIGNIFICANT CHANGE UP
HDLC SERPL-MCNC: 41 MG/DL — LOW
HGB BLD-MCNC: 10.6 G/DL — LOW (ref 11.5–15.5)
HGB BLD-MCNC: 11.7 G/DL — SIGNIFICANT CHANGE UP (ref 11.5–15.5)
IMM GRANULOCYTES NFR BLD AUTO: 0.4 % — SIGNIFICANT CHANGE UP (ref 0–0.9)
INR BLD: 1.99 — HIGH (ref 0.85–1.18)
IRON SATN MFR SERPL: 35 % — SIGNIFICANT CHANGE UP (ref 14–50)
IRON SATN MFR SERPL: 93 UG/DL — SIGNIFICANT CHANGE UP (ref 30–160)
LIPID PNL WITH DIRECT LDL SERPL: 52 MG/DL — SIGNIFICANT CHANGE UP
LYMPHOCYTES # BLD AUTO: 0.96 K/UL — LOW (ref 1–3.3)
LYMPHOCYTES # BLD AUTO: 19.2 % — SIGNIFICANT CHANGE UP (ref 13–44)
MAGNESIUM SERPL-MCNC: 1.9 MG/DL — SIGNIFICANT CHANGE UP (ref 1.6–2.6)
MCHC RBC-ENTMCNC: 32.8 GM/DL — SIGNIFICANT CHANGE UP (ref 32–36)
MCHC RBC-ENTMCNC: 33.9 PG — SIGNIFICANT CHANGE UP (ref 27–34)
MCHC RBC-ENTMCNC: 34.6 GM/DL — SIGNIFICANT CHANGE UP (ref 32–36)
MCHC RBC-ENTMCNC: 34.9 PG — HIGH (ref 27–34)
MCV RBC AUTO: 100.9 FL — HIGH (ref 80–100)
MCV RBC AUTO: 103.2 FL — HIGH (ref 80–100)
MONOCYTES # BLD AUTO: 0.56 K/UL — SIGNIFICANT CHANGE UP (ref 0–0.9)
MONOCYTES NFR BLD AUTO: 11.2 % — SIGNIFICANT CHANGE UP (ref 2–14)
NEUTROPHILS # BLD AUTO: 3.12 K/UL — SIGNIFICANT CHANGE UP (ref 1.8–7.4)
NEUTROPHILS NFR BLD AUTO: 62.2 % — SIGNIFICANT CHANGE UP (ref 43–77)
NON HDL CHOLESTEROL: 66 MG/DL — SIGNIFICANT CHANGE UP
NRBC # BLD: 0 /100 WBCS — SIGNIFICANT CHANGE UP (ref 0–0)
NRBC # BLD: 0 /100 WBCS — SIGNIFICANT CHANGE UP (ref 0–0)
PLATELET # BLD AUTO: 158 K/UL — SIGNIFICANT CHANGE UP (ref 150–400)
PLATELET # BLD AUTO: 212 K/UL — SIGNIFICANT CHANGE UP (ref 150–400)
POTASSIUM SERPL-MCNC: 3.4 MMOL/L — LOW (ref 3.5–5.3)
POTASSIUM SERPL-SCNC: 3.4 MMOL/L — LOW (ref 3.5–5.3)
PROT SERPL-MCNC: 6.5 G/DL — SIGNIFICANT CHANGE UP (ref 6–8.3)
PROTHROM AB SERPL-ACNC: 22.2 SEC — HIGH (ref 9.5–13)
RBC # BLD: 3.13 M/UL — LOW (ref 3.8–5.2)
RBC # BLD: 3.35 M/UL — LOW (ref 3.8–5.2)
RBC # FLD: 13.4 % — SIGNIFICANT CHANGE UP (ref 10.3–14.5)
RBC # FLD: 13.7 % — SIGNIFICANT CHANGE UP (ref 10.3–14.5)
RH IG SCN BLD-IMP: POSITIVE — SIGNIFICANT CHANGE UP
SODIUM SERPL-SCNC: 136 MMOL/L — SIGNIFICANT CHANGE UP (ref 135–145)
TIBC SERPL-MCNC: 266 UG/DL — SIGNIFICANT CHANGE UP (ref 220–430)
TRANSFERRIN SERPL-MCNC: 224 MG/DL — SIGNIFICANT CHANGE UP (ref 200–360)
TRIGL SERPL-MCNC: 72 MG/DL — SIGNIFICANT CHANGE UP
TSH SERPL-MCNC: 3.72 UIU/ML — SIGNIFICANT CHANGE UP (ref 0.27–4.2)
TSH SERPL-MCNC: 3.76 UIU/ML — SIGNIFICANT CHANGE UP (ref 0.27–4.2)
UIBC SERPL-MCNC: 173 UG/DL — SIGNIFICANT CHANGE UP (ref 110–370)
VIT B12 SERPL-MCNC: 824 PG/ML — SIGNIFICANT CHANGE UP (ref 232–1245)
WBC # BLD: 5.01 K/UL — SIGNIFICANT CHANGE UP (ref 3.8–10.5)
WBC # BLD: 7.07 K/UL — SIGNIFICANT CHANGE UP (ref 3.8–10.5)
WBC # FLD AUTO: 5.01 K/UL — SIGNIFICANT CHANGE UP (ref 3.8–10.5)
WBC # FLD AUTO: 7.07 K/UL — SIGNIFICANT CHANGE UP (ref 3.8–10.5)

## 2024-02-03 PROCEDURE — 93306 TTE W/DOPPLER COMPLETE: CPT | Mod: 26

## 2024-02-03 PROCEDURE — 93010 ELECTROCARDIOGRAM REPORT: CPT

## 2024-02-03 RX ORDER — HEPARIN SODIUM 5000 [USP'U]/ML
INJECTION INTRAVENOUS; SUBCUTANEOUS
Qty: 25000 | Refills: 0 | Status: DISCONTINUED | OUTPATIENT
Start: 2024-02-03 | End: 2024-02-06

## 2024-02-03 RX ORDER — HEPARIN SODIUM 5000 [USP'U]/ML
3500 INJECTION INTRAVENOUS; SUBCUTANEOUS EVERY 6 HOURS
Refills: 0 | Status: DISCONTINUED | OUTPATIENT
Start: 2024-02-03 | End: 2024-02-06

## 2024-02-03 RX ORDER — FUROSEMIDE 40 MG
20 TABLET ORAL ONCE
Refills: 0 | Status: COMPLETED | OUTPATIENT
Start: 2024-02-03 | End: 2024-02-03

## 2024-02-03 RX ORDER — METOPROLOL TARTRATE 50 MG
50 TABLET ORAL
Refills: 0 | Status: DISCONTINUED | OUTPATIENT
Start: 2024-02-03 | End: 2024-02-06

## 2024-02-03 RX ORDER — HEPARIN SODIUM 5000 [USP'U]/ML
7500 INJECTION INTRAVENOUS; SUBCUTANEOUS EVERY 6 HOURS
Refills: 0 | Status: DISCONTINUED | OUTPATIENT
Start: 2024-02-03 | End: 2024-02-06

## 2024-02-03 RX ORDER — POTASSIUM CHLORIDE 20 MEQ
40 PACKET (EA) ORAL EVERY 4 HOURS
Refills: 0 | Status: COMPLETED | OUTPATIENT
Start: 2024-02-03 | End: 2024-02-03

## 2024-02-03 RX ORDER — ASPIRIN/CALCIUM CARB/MAGNESIUM 324 MG
81 TABLET ORAL DAILY
Refills: 0 | Status: DISCONTINUED | OUTPATIENT
Start: 2024-02-04 | End: 2024-02-07

## 2024-02-03 RX ADMIN — HEPARIN SODIUM 1700 UNIT(S)/HR: 5000 INJECTION INTRAVENOUS; SUBCUTANEOUS at 20:11

## 2024-02-03 RX ADMIN — Medication 1200 MILLIGRAM(S): at 17:24

## 2024-02-03 RX ADMIN — Medication 1200 MILLIGRAM(S): at 07:49

## 2024-02-03 RX ADMIN — APIXABAN 5 MILLIGRAM(S): 2.5 TABLET, FILM COATED ORAL at 07:11

## 2024-02-03 RX ADMIN — Medication 25 MILLIGRAM(S): at 07:11

## 2024-02-03 RX ADMIN — HEPARIN SODIUM 1700 UNIT(S)/HR: 5000 INJECTION INTRAVENOUS; SUBCUTANEOUS at 15:38

## 2024-02-03 RX ADMIN — GABAPENTIN 300 MILLIGRAM(S): 400 CAPSULE ORAL at 14:21

## 2024-02-03 RX ADMIN — Medication 20 MILLIGRAM(S): at 01:42

## 2024-02-03 RX ADMIN — ZOLPIDEM TARTRATE 5 MILLIGRAM(S): 10 TABLET ORAL at 22:34

## 2024-02-03 RX ADMIN — GABAPENTIN 300 MILLIGRAM(S): 400 CAPSULE ORAL at 07:11

## 2024-02-03 RX ADMIN — Medication 112 MICROGRAM(S): at 05:51

## 2024-02-03 RX ADMIN — HEPARIN SODIUM 1400 UNIT(S)/HR: 5000 INJECTION INTRAVENOUS; SUBCUTANEOUS at 23:14

## 2024-02-03 RX ADMIN — GABAPENTIN 300 MILLIGRAM(S): 400 CAPSULE ORAL at 00:10

## 2024-02-03 RX ADMIN — Medication 40 MILLIEQUIVALENT(S): at 14:22

## 2024-02-03 RX ADMIN — ZOLPIDEM TARTRATE 5 MILLIGRAM(S): 10 TABLET ORAL at 00:10

## 2024-02-03 RX ADMIN — HEPARIN SODIUM 0 UNIT(S)/HR: 5000 INJECTION INTRAVENOUS; SUBCUTANEOUS at 22:11

## 2024-02-03 RX ADMIN — ATORVASTATIN CALCIUM 40 MILLIGRAM(S): 80 TABLET, FILM COATED ORAL at 22:34

## 2024-02-03 RX ADMIN — GABAPENTIN 300 MILLIGRAM(S): 400 CAPSULE ORAL at 22:34

## 2024-02-03 RX ADMIN — APIXABAN 5 MILLIGRAM(S): 2.5 TABLET, FILM COATED ORAL at 00:10

## 2024-02-03 RX ADMIN — Medication 50 MILLIGRAM(S): at 17:24

## 2024-02-03 RX ADMIN — Medication 40 MILLIEQUIVALENT(S): at 11:21

## 2024-02-03 RX ADMIN — CITALOPRAM 40 MILLIGRAM(S): 10 TABLET, FILM COATED ORAL at 14:22

## 2024-02-03 RX ADMIN — BUPROPION HYDROCHLORIDE 300 MILLIGRAM(S): 150 TABLET, EXTENDED RELEASE ORAL at 11:22

## 2024-02-03 RX ADMIN — PANTOPRAZOLE SODIUM 40 MILLIGRAM(S): 20 TABLET, DELAYED RELEASE ORAL at 07:11

## 2024-02-03 NOTE — PROGRESS NOTE ADULT - PROBLEM SELECTOR PLAN 4
- Drinks ~3 glasses of wine/day; last drink > 24hrs ago.   - No signs of withdrawal at this time; monitor CIWA. - TSH WNL; CONT: Levothyroxine 112mcg PO QD.

## 2024-02-03 NOTE — PROGRESS NOTE ADULT - SUBJECTIVE AND OBJECTIVE BOX
Cardiology PA Adult Progress Note    Subjective Assessment: Pt feels well denies CP/SOB at this time.   	  MEDICATIONS:  metoprolol succinate ER 25 milliGRAM(s) Oral daily  buPROPion XL (24-Hour) . 300 milliGRAM(s) Oral daily  citalopram 40 milliGRAM(s) Oral daily  gabapentin 300 milliGRAM(s) Oral three times a day  SUMAtriptan 50 milliGRAM(s) Oral daily PRN  zolpidem 5 milliGRAM(s) Oral at bedtime PRN  zolpidem 5 milliGRAM(s) Oral at bedtime PRN  mesalamine DR Capsule 1200 milliGRAM(s) Oral every 12 hours  pantoprazole    Tablet 40 milliGRAM(s) Oral before breakfast  atorvastatin 40 milliGRAM(s) Oral at bedtime  levothyroxine 112 MICROGram(s) Oral daily  apixaban 5 milliGRAM(s) Oral every 12 hours  potassium chloride    Tablet ER 40 milliEquivalent(s) Oral every 4 hours      PHYSICAL EXAM:  TELEMETRY:  T(C): 36.6 (02-03-24 @ 11:20), Max: 36.6 (02-02-24 @ 15:30)  HR: 78 (02-03-24 @ 11:20) (72 - 111)  BP: 127/70 (02-03-24 @ 11:20) (106/69 - 136/74)  RR: 18 (02-03-24 @ 11:20) (18 - 20)  SpO2: 96% (02-03-24 @ 11:20) (94% - 98%)  Wt(kg): --  I&O's Summary    02 Feb 2024 07:01  -  03 Feb 2024 07:00  --------------------------------------------------------  IN: 0 mL / OUT: 1000 mL / NET: -1000 mL    03 Feb 2024 07:01  -  03 Feb 2024 12:21  --------------------------------------------------------  IN: 180 mL / OUT: 0 mL / NET: 180 mL      Height (cm): 175.3 (02-03 @ 01:11)  Weight (kg): 95.3 (02-02 @ 22:38)  BMI (kg/m2): 31 (02-03 @ 01:11)  BSA (m2): 2.11 (02-03 @ 01:11)    Appearance: Normal	  HEENT:   Normal oral mucosa, PERRL, EOMI	  Neck: Supple,- JVD;   Cardiovascular: Normal S1 S2, No JVD, No murmurs,   Respiratory: Lungs clear to auscultation  Gastrointestinal:  Soft, Non-tender, + BS	  Skin: No rashes, No ecchymoses, No cyanosis  Extremities: Normal range of motion, No clubbing, cyanosis; L LE pretibial pitting edema 1+  Vascular: Peripheral pulses palpable 2+ bilaterally  Neurologic: Non-focal  Psychiatry: A & O x 3, Mood & affect appropriate    	    LABS:	 	  CARDIAC MARKERS:                        10.6   5.01  )-----------( 158      ( 03 Feb 2024 07:32 )             32.3     136  |  102  |  10  ----------------------------<  108<H>  3.4<L>   |  24  |  0.72    Ca    8.9      03 Feb 2024 07:32    Mg     1.9     02-03    TPro  6.5  /  Alb  3.8  /  TBili  0.8  /  DBili  0.3  /  AST  52<H>  /  ALT  32  /  AlkPhos  83  02-03    TSH: Thyroid Stimulating Hormone, Serum: 3.760 uIU/mL (02-03 @ 07:32)    Thyroid Stimulating Hormone, Serum: 3.720 uIU/mL (02-03 @ 07:32)    PT/INR - ( 03 Feb 2024 07:32 )   PT: 22.2 sec;   INR: 1.99       PTT - ( 03 Feb 2024 07:32 )  PTT:41.3 sec

## 2024-02-03 NOTE — PROGRESS NOTE ADULT - ASSESSMENT
73-year-old female patient with PMHx daily alcohol use (no hx hospitalizations/withdrawal), Leung's Esophagus, Colitis (on Mesalamine), Afib (on Eliquis), spinal stenosis, migraine headaches, anxiety/depression, anemia (unknown baseline hgb, required transfusion 2015), with two days of exertional SOB/chest pressure, admitted to cardiac telemetry to r/o ACS.  73-year-old female patient with PMHx daily alcohol use (no hx hospitalizations/withdrawal), Leung's Esophagus, Colitis (on Mesalamine), Afib (on Eliquis), spinal stenosis, migraine headaches, anxiety/depression, anemia (unknown baseline hgb, required transfusion 2015), with two days of exertional SOB/chest pressure. Plan for ischemic eval Monday 2/5/24 with CCTA.

## 2024-02-03 NOTE — PROGRESS NOTE ADULT - PROBLEM SELECTOR PLAN 6
- CONT: Citalopram 40 mg QD and Bupropion 300 mg QD ## CONT: Mesalamine 1200mg PO BID.    ## RMUA'S ESOPHAGUS  - Denies any signs of bleeding; CONT: Pantoprazole 40mg PO QD.

## 2024-02-03 NOTE — PROGRESS NOTE ADULT - PROBLEM SELECTOR PLAN 7
## CONT: Mesalamine 1.2mg PO BID.    ## RUMA'S ESOPHAGUS  - Denies any signs of bleeding; CONT: Pantoprazole 40mg PO QD. Pt reports hx spinal stenosis, 5/5 strength b/l LE, sensation grossly intact   - CONT: Gabapentin 300 mg TID   - HOLD: Naproxen 250 mg BID

## 2024-02-03 NOTE — PROGRESS NOTE ADULT - PROBLEM SELECTOR PLAN 8
Pt reports hx spinal stenosis, 5/5 strength b/l LE, sensation grossly intact   - CONT: Gabapentin 300 mg TID   - HOLD: Naproxen 250 mg BID - CONT: Sumatriptan 50 mg QD PRN     ## INSOMNIA  -CONT: Ambien 10mg PO QHS PRN

## 2024-02-03 NOTE — PROGRESS NOTE ADULT - PROBLEM SELECTOR PLAN 9
- CONT: Sumatriptan 50 mg QD PRN     ## INSOMNIA  -CONT: Ambien 10mg PO QHS PRN - LDL 52.   - CONT: Atorvastatin 40mg PO QD      DVT ppx: Eliquis 5 mg BID  F: tolerating PO  E: Keep K > 4, Mg > 2  N: DASH/TLC w/ CC     Code: Full  Dispo: pending clinical progression

## 2024-02-03 NOTE — PROGRESS NOTE ADULT - PROBLEM SELECTOR PLAN 1
- 3 days of worsening exertional SOB + midsternal chest pressure; currently symptoms free.   - HS Trop T 6 > 6; EKG w/ AFib HR 100bpm.   - Received Lasix 20mg IV x1 on admission; euvolemic, no indication for further diuretics.  - Pt reports having normal outpatient stress test 10/2023.    - PLAN: TTE for structural eval; NPO at [facility] 2/4/24 with plan for CCTA for ischemic eval 2/5/24; Increase beta blocker to Lopressor 50mg PO BID for better rate control in preparation for CCTA study. - 3 days of worsening exertional SOB + midsternal chest pressure; currently symptoms free.   - HS Trop T 6 > 6; EKG w/ AFib HR 100bpm.   - Received Lasix 20mg IV x1 on admission; euvolemic, no indication for further diuretics.  - Pt reports having normal outpatient stress test 10/2023.    - PLAN: TTE for structural eval; NPO at [facility] 2/4/24 with plan for CCTA for ischemic eval 2/5/24; Increase beta blocker to Lopressor 50mg PO BID for better rate control in preparation for CCTA study. Change AC from Eliquis to Heparin gtt in case patient needs LHC following CCTA.

## 2024-02-03 NOTE — PROGRESS NOTE ADULT - PROBLEM SELECTOR PLAN 2
- Recently Dx w/ AFib 10/2023; currently AFib w/ HR 70s-80s.   - CONT: Eliquis 5mg PO BID, Toprol 25mg PO QD. - Recently Dx w/ AFib 10/2023; currently AFib w/ HR 80s.   - CONT: Heparin gtt and Lopressor 50mg PO BID.

## 2024-02-03 NOTE — PHYSICAL THERAPY INITIAL EVALUATION ADULT - ADDITIONAL COMMENTS
Pt. lives alone in an elevator access apartment. At baseline, ambulates independently with no DME. Pt. reports that she does not live a sedentary lifestyle and walks her dog 3x/day.

## 2024-02-03 NOTE — PHYSICAL THERAPY INITIAL EVALUATION ADULT - PERTINENT HX OF CURRENT PROBLEM, REHAB EVAL
done
73-year-old female patient with PMHx daily alcohol use (no hx hospitalizations/withdrawal), Leung's Esophagus, Colitis (on Mesalamine), Afib (on Eliquis), spinal stenosis, migraine headaches, anxiety/depression, anemia (unknown baseline hgb, required transfusion 2015), with two days of exertional SOB/chest pressure. Plan for ischemic eval Monday 2/5/24 with CCTA.

## 2024-02-03 NOTE — PROGRESS NOTE ADULT - PROBLEM SELECTOR PLAN 10
- LDL 52.   - CONT: Atorvastatin 40mg PO QD      DVT ppx: Eliquis 5 mg BID  F: tolerating PO  E: Keep K > 4, Mg > 2  N: DASH/TLC w/ CC     Code: Full  Dispo: pending clinical progression

## 2024-02-04 LAB
ALBUMIN SERPL ELPH-MCNC: 3.6 G/DL — SIGNIFICANT CHANGE UP (ref 3.3–5)
ALP SERPL-CCNC: 80 U/L — SIGNIFICANT CHANGE UP (ref 40–120)
ALT FLD-CCNC: 28 U/L — SIGNIFICANT CHANGE UP (ref 10–45)
ANION GAP SERPL CALC-SCNC: 10 MMOL/L — SIGNIFICANT CHANGE UP (ref 5–17)
APTT BLD: 105.1 SEC — HIGH (ref 24.5–35.6)
APTT BLD: 123.5 SEC — CRITICAL HIGH (ref 24.5–35.6)
APTT BLD: 96.2 SEC — HIGH (ref 24.5–35.6)
AST SERPL-CCNC: 46 U/L — HIGH (ref 10–40)
BILIRUB SERPL-MCNC: 0.8 MG/DL — SIGNIFICANT CHANGE UP (ref 0.2–1.2)
BUN SERPL-MCNC: 12 MG/DL — SIGNIFICANT CHANGE UP (ref 7–23)
CALCIUM SERPL-MCNC: 8.6 MG/DL — SIGNIFICANT CHANGE UP (ref 8.4–10.5)
CHLORIDE SERPL-SCNC: 100 MMOL/L — SIGNIFICANT CHANGE UP (ref 96–108)
CO2 SERPL-SCNC: 23 MMOL/L — SIGNIFICANT CHANGE UP (ref 22–31)
CREAT SERPL-MCNC: 0.74 MG/DL — SIGNIFICANT CHANGE UP (ref 0.5–1.3)
EGFR: 85 ML/MIN/1.73M2 — SIGNIFICANT CHANGE UP
GLUCOSE SERPL-MCNC: 109 MG/DL — HIGH (ref 70–99)
HCT VFR BLD CALC: 32.4 % — LOW (ref 34.5–45)
HGB BLD-MCNC: 10.9 G/DL — LOW (ref 11.5–15.5)
MAGNESIUM SERPL-MCNC: 1.8 MG/DL — SIGNIFICANT CHANGE UP (ref 1.6–2.6)
MCHC RBC-ENTMCNC: 33.6 GM/DL — SIGNIFICANT CHANGE UP (ref 32–36)
MCHC RBC-ENTMCNC: 34.4 PG — HIGH (ref 27–34)
MCV RBC AUTO: 102.2 FL — HIGH (ref 80–100)
NRBC # BLD: 0 /100 WBCS — SIGNIFICANT CHANGE UP (ref 0–0)
PLATELET # BLD AUTO: 164 K/UL — SIGNIFICANT CHANGE UP (ref 150–400)
POTASSIUM SERPL-MCNC: 4 MMOL/L — SIGNIFICANT CHANGE UP (ref 3.5–5.3)
POTASSIUM SERPL-SCNC: 4 MMOL/L — SIGNIFICANT CHANGE UP (ref 3.5–5.3)
PROT SERPL-MCNC: 6.5 G/DL — SIGNIFICANT CHANGE UP (ref 6–8.3)
RBC # BLD: 3.17 M/UL — LOW (ref 3.8–5.2)
RBC # FLD: 13.3 % — SIGNIFICANT CHANGE UP (ref 10.3–14.5)
SODIUM SERPL-SCNC: 133 MMOL/L — LOW (ref 135–145)
WBC # BLD: 5.72 K/UL — SIGNIFICANT CHANGE UP (ref 3.8–10.5)
WBC # FLD AUTO: 5.72 K/UL — SIGNIFICANT CHANGE UP (ref 3.8–10.5)

## 2024-02-04 PROCEDURE — 93970 EXTREMITY STUDY: CPT | Mod: 26

## 2024-02-04 RX ORDER — MAGNESIUM OXIDE 400 MG ORAL TABLET 241.3 MG
800 TABLET ORAL ONCE
Refills: 0 | Status: COMPLETED | OUTPATIENT
Start: 2024-02-04 | End: 2024-02-04

## 2024-02-04 RX ADMIN — Medication 1200 MILLIGRAM(S): at 17:06

## 2024-02-04 RX ADMIN — HEPARIN SODIUM 1000 UNIT(S)/HR: 5000 INJECTION INTRAVENOUS; SUBCUTANEOUS at 20:09

## 2024-02-04 RX ADMIN — MAGNESIUM OXIDE 400 MG ORAL TABLET 800 MILLIGRAM(S): 241.3 TABLET ORAL at 08:55

## 2024-02-04 RX ADMIN — HEPARIN SODIUM 1200 UNIT(S)/HR: 5000 INJECTION INTRAVENOUS; SUBCUTANEOUS at 08:14

## 2024-02-04 RX ADMIN — GABAPENTIN 300 MILLIGRAM(S): 400 CAPSULE ORAL at 07:16

## 2024-02-04 RX ADMIN — Medication 50 MILLIGRAM(S): at 17:06

## 2024-02-04 RX ADMIN — ZOLPIDEM TARTRATE 5 MILLIGRAM(S): 10 TABLET ORAL at 00:09

## 2024-02-04 RX ADMIN — GABAPENTIN 300 MILLIGRAM(S): 400 CAPSULE ORAL at 22:18

## 2024-02-04 RX ADMIN — CITALOPRAM 40 MILLIGRAM(S): 10 TABLET, FILM COATED ORAL at 22:18

## 2024-02-04 RX ADMIN — Medication 112 MICROGRAM(S): at 05:53

## 2024-02-04 RX ADMIN — BUPROPION HYDROCHLORIDE 300 MILLIGRAM(S): 150 TABLET, EXTENDED RELEASE ORAL at 08:59

## 2024-02-04 RX ADMIN — ZOLPIDEM TARTRATE 5 MILLIGRAM(S): 10 TABLET ORAL at 23:23

## 2024-02-04 RX ADMIN — Medication 81 MILLIGRAM(S): at 09:00

## 2024-02-04 RX ADMIN — ZOLPIDEM TARTRATE 5 MILLIGRAM(S): 10 TABLET ORAL at 22:18

## 2024-02-04 RX ADMIN — HEPARIN SODIUM 1000 UNIT(S)/HR: 5000 INJECTION INTRAVENOUS; SUBCUTANEOUS at 20:27

## 2024-02-04 RX ADMIN — HEPARIN SODIUM 1200 UNIT(S)/HR: 5000 INJECTION INTRAVENOUS; SUBCUTANEOUS at 13:18

## 2024-02-04 RX ADMIN — Medication 50 MILLIGRAM(S): at 07:16

## 2024-02-04 RX ADMIN — GABAPENTIN 300 MILLIGRAM(S): 400 CAPSULE ORAL at 14:57

## 2024-02-04 RX ADMIN — PANTOPRAZOLE SODIUM 40 MILLIGRAM(S): 20 TABLET, DELAYED RELEASE ORAL at 07:16

## 2024-02-04 RX ADMIN — Medication 1200 MILLIGRAM(S): at 07:17

## 2024-02-04 RX ADMIN — ATORVASTATIN CALCIUM 40 MILLIGRAM(S): 80 TABLET, FILM COATED ORAL at 22:18

## 2024-02-04 RX ADMIN — HEPARIN SODIUM 1200 UNIT(S)/HR: 5000 INJECTION INTRAVENOUS; SUBCUTANEOUS at 05:50

## 2024-02-04 RX ADMIN — HEPARIN SODIUM 1200 UNIT(S)/HR: 5000 INJECTION INTRAVENOUS; SUBCUTANEOUS at 08:12

## 2024-02-04 NOTE — PROGRESS NOTE ADULT - NS ATTEND AMEND GEN_ALL_CORE FT
73-year-old female patient with PMHx daily alcohol use (no hx hospitalizations/withdrawal), Leung's Esophagus, Colitis (on Mesalamine), Afib (on Eliquis), spinal stenosis, migraine headaches, anxiety/depression, anemia (unknown baseline hgb, required transfusion 2015), with two days of exertional SOB/chest pressure, admitted to cardiac telemetry to r/o ACS.   CP free at present    1. CP    Hx as above  atypical, likely related to GI  Trop negative Ddimer low end of profile for age, low likelihood on Elqiuis for AF    recent stress test wnl  Will obtain CCTA in AM    2. AF, persistent   HR controlled   AC management as above
73-year-old female patient with PMHx daily alcohol use (no hx hospitalizations/withdrawal), Leung's Esophagus, Colitis (on Mesalamine), Afib (on Eliquis), spinal stenosis, migraine headaches, anxiety/depression, anemia (unknown baseline hgb, required transfusion 2015), with two days of exertional SOB/chest pressure, admitted to cardiac telemetry to r/o ACS.   CP free at present    1. CP    Hx as above  atypical, likely related to GI  Trop negative Ddimer low end of profile for age, low likelihood on Elqiuis for AF    recent stress test wnl  Will obtain CCTA     2. AF, persistent   HR controlled   AC management as above

## 2024-02-04 NOTE — PROGRESS NOTE ADULT - PROBLEM SELECTOR PLAN 6
## CONT: Mesalamine 1200mg PO BID.    ## RUMA'S ESOPHAGUS  - Denies any signs of bleeding; CONT: Pantoprazole 40mg PO QD.

## 2024-02-04 NOTE — PROGRESS NOTE ADULT - PROBLEM SELECTOR PLAN 2
- Recently Dx w/ AFib 10/2023; currently AFib w/ HR 80s.   - CONT: Heparin gtt and Lopressor 50mg PO BID (in case patient needs LHC following CCTA) Contact [facility] (es)

## 2024-02-04 NOTE — PROGRESS NOTE ADULT - PROBLEM SELECTOR PLAN 1
- 3 days of worsening exertional SOB + midsternal chest pressure; currently symptoms free.   - HS Trop T 6 > 6; EKG w/ AFib HR 100bpm.   - Received Lasix 20mg IV x1 on admission; euvolemic, no indication for further diuretics.  - Pt reports having normal outpatient stress test 10/2023.    - PLAN: TTE for structural eval; NPO at [facility] 2/4/24 with plan for CCTA for ischemic eval 2/5/24

## 2024-02-04 NOTE — PROGRESS NOTE ADULT - PROBLEM SELECTOR PLAN 7
Pt reports hx spinal stenosis, 5/5 strength b/l LE, sensation grossly intact   - CONT: Gabapentin 300 mg TID   - HOLD: Naproxen 250 mg BID

## 2024-02-04 NOTE — PROGRESS NOTE ADULT - ASSESSMENT
73-year-old female patient with PMHx daily alcohol use (no hx hospitalizations/withdrawal), Leung's Esophagus, Colitis (on Mesalamine), Afib (on Eliquis), spinal stenosis, migraine headaches, anxiety/depression, anemia (unknown baseline hgb, required transfusion 2015), with two days of exertional SOB/chest pressure. Plan for ischemic eval Monday 2/5/24 with CCTA.  73-year-old female patient with PMHx daily alcohol use (no hx hospitalizations/withdrawal), Leung's Esophagus, Colitis (on Mesalamine), Afib (on Eliquis), spinal stenosis, migraine headaches, anxiety/depression, anemia (unknown baseline hgb, required transfusion 2015), with two days of exertional SOB/chest pressure. TTE revealed severe TR. Plan for ischemic eval Monday 2/5/24 with CCTA.

## 2024-02-04 NOTE — PROGRESS NOTE ADULT - SUBJECTIVE AND OBJECTIVE BOX
Interventional Cardiology PA Adult Progress Note    Subjective Assessment:  Pt seen and examined at bedside. Patient denied chest pain, abdominal pain, syncope, presyncope, lightheadedness and dysuria.    ROS Negative except as per Subjective and HPI  	  MEDICATIONS:  metoprolol tartrate 50 milliGRAM(s) Oral two times a day  buPROPion XL (24-Hour) . 300 milliGRAM(s) Oral daily  citalopram 40 milliGRAM(s) Oral daily  gabapentin 300 milliGRAM(s) Oral three times a day  SUMAtriptan 50 milliGRAM(s) Oral daily PRN  zolpidem 5 milliGRAM(s) Oral at bedtime PRN  zolpidem 5 milliGRAM(s) Oral at bedtime PRN  mesalamine DR Capsule 1200 milliGRAM(s) Oral every 12 hours  pantoprazole    Tablet 40 milliGRAM(s) Oral before breakfast  atorvastatin 40 milliGRAM(s) Oral at bedtime  levothyroxine 112 MICROGram(s) Oral daily  aspirin enteric coated 81 milliGRAM(s) Oral daily  heparin   Injectable 7500 Unit(s) IV Push every 6 hours PRN  heparin   Injectable 3500 Unit(s) IV Push every 6 hours PRN  heparin  Infusion.  Unit(s)/Hr IV Continuous <Continuous>  	  [PHYSICAL EXAM:  TELEMETRY:  T(C): 36.7 (02-04-24 @ 08:38), Max: 36.8 (02-03-24 @ 20:13)  HR: 79 (02-04-24 @ 08:38) (79 - 101)  BP: 129/83 (02-04-24 @ 08:38) (129/83 - 141/73)  RR: 18 (02-04-24 @ 08:38) (18 - 18)  SpO2: 94% (02-04-24 @ 08:38) (93% - 96%)  Wt(kg): --  I&O's Summary    03 Feb 2024 07:01  -  04 Feb 2024 07:00  --------------------------------------------------------  IN: 540 mL / OUT: 0 mL / NET: 540 mL    04 Feb 2024 07:01  -  04 Feb 2024 12:28  --------------------------------------------------------  IN: 180 mL / OUT: 0 mL / NET: 180 mL      Appearance: Normal	  HEENT:   Normal oral mucosa  Neck: - JVD  Cardiovascular:  No murmurs,   Respiratory: Lungs clear to auscultation  Gastrointestinal:  Soft, Non-tender, + BS	  Skin: No rashes, No ecchymoses, No cyanosis  Extremities: Normal range of motion  Neurologic: Non-focal  Psychiatry: A & O x 3, Mood & affect appropriate                          10.9   5.72  )-----------( 164      ( 04 Feb 2024 07:12 )             32.4     02-04    133<L>  |  100  |  12  ----------------------------<  109<H>  4.0   |  23  |  0.74    Ca    8.6      04 Feb 2024 07:12  Mg     1.8     02-04    TPro  6.5  /  Alb  3.6  /  TBili  0.8  /  DBili  x   /  AST  46<H>  /  ALT  28  /  AlkPhos  80  02-04  PT/INR - ( 03 Feb 2024 07:32 )   PT: 22.2 sec;   INR: 1.99      PTT - ( 04 Feb 2024 04:46 )  PTT:123.5 sec

## 2024-02-05 LAB
ALBUMIN SERPL ELPH-MCNC: 3.8 G/DL — SIGNIFICANT CHANGE UP (ref 3.3–5)
ALP SERPL-CCNC: 84 U/L — SIGNIFICANT CHANGE UP (ref 40–120)
ALT FLD-CCNC: 30 U/L — SIGNIFICANT CHANGE UP (ref 10–45)
ANION GAP SERPL CALC-SCNC: 11 MMOL/L — SIGNIFICANT CHANGE UP (ref 5–17)
APPEARANCE UR: CLEAR — SIGNIFICANT CHANGE UP
APTT BLD: 100.2 SEC — HIGH (ref 24.5–35.6)
APTT BLD: 105 SEC — HIGH (ref 24.5–35.6)
APTT BLD: 48.9 SEC — HIGH (ref 24.5–35.6)
APTT BLD: 70.6 SEC — HIGH (ref 24.5–35.6)
AST SERPL-CCNC: 44 U/L — HIGH (ref 10–40)
BILIRUB SERPL-MCNC: 0.8 MG/DL — SIGNIFICANT CHANGE UP (ref 0.2–1.2)
BILIRUB UR-MCNC: NEGATIVE — SIGNIFICANT CHANGE UP
BUN SERPL-MCNC: 10 MG/DL — SIGNIFICANT CHANGE UP (ref 7–23)
CALCIUM SERPL-MCNC: 9.1 MG/DL — SIGNIFICANT CHANGE UP (ref 8.4–10.5)
CHLORIDE SERPL-SCNC: 99 MMOL/L — SIGNIFICANT CHANGE UP (ref 96–108)
CO2 SERPL-SCNC: 22 MMOL/L — SIGNIFICANT CHANGE UP (ref 22–31)
COLOR SPEC: YELLOW — SIGNIFICANT CHANGE UP
CREAT SERPL-MCNC: 0.69 MG/DL — SIGNIFICANT CHANGE UP (ref 0.5–1.3)
DIFF PNL FLD: NEGATIVE — SIGNIFICANT CHANGE UP
EGFR: 91 ML/MIN/1.73M2 — SIGNIFICANT CHANGE UP
GLUCOSE SERPL-MCNC: 106 MG/DL — HIGH (ref 70–99)
GLUCOSE UR QL: NEGATIVE MG/DL — SIGNIFICANT CHANGE UP
HCT VFR BLD CALC: 32.5 % — LOW (ref 34.5–45)
HGB BLD-MCNC: 11 G/DL — LOW (ref 11.5–15.5)
KETONES UR-MCNC: NEGATIVE MG/DL — SIGNIFICANT CHANGE UP
LEUKOCYTE ESTERASE UR-ACNC: NEGATIVE — SIGNIFICANT CHANGE UP
MAGNESIUM SERPL-MCNC: 2 MG/DL — SIGNIFICANT CHANGE UP (ref 1.6–2.6)
MCHC RBC-ENTMCNC: 33.8 GM/DL — SIGNIFICANT CHANGE UP (ref 32–36)
MCHC RBC-ENTMCNC: 34.4 PG — HIGH (ref 27–34)
MCV RBC AUTO: 101.6 FL — HIGH (ref 80–100)
NITRITE UR-MCNC: NEGATIVE — SIGNIFICANT CHANGE UP
NRBC # BLD: 0 /100 WBCS — SIGNIFICANT CHANGE UP (ref 0–0)
PH UR: 7.5 — SIGNIFICANT CHANGE UP (ref 5–8)
PLATELET # BLD AUTO: 172 K/UL — SIGNIFICANT CHANGE UP (ref 150–400)
POTASSIUM SERPL-MCNC: 3.9 MMOL/L — SIGNIFICANT CHANGE UP (ref 3.5–5.3)
POTASSIUM SERPL-SCNC: 3.9 MMOL/L — SIGNIFICANT CHANGE UP (ref 3.5–5.3)
PROT SERPL-MCNC: 6.4 G/DL — SIGNIFICANT CHANGE UP (ref 6–8.3)
PROT UR-MCNC: NEGATIVE MG/DL — SIGNIFICANT CHANGE UP
RBC # BLD: 3.2 M/UL — LOW (ref 3.8–5.2)
RBC # FLD: 13.4 % — SIGNIFICANT CHANGE UP (ref 10.3–14.5)
SODIUM SERPL-SCNC: 132 MMOL/L — LOW (ref 135–145)
SP GR SPEC: 1.01 — SIGNIFICANT CHANGE UP (ref 1–1.03)
UROBILINOGEN FLD QL: 0.2 MG/DL — SIGNIFICANT CHANGE UP (ref 0.2–1)
WBC # BLD: 5.34 K/UL — SIGNIFICANT CHANGE UP (ref 3.8–10.5)
WBC # FLD AUTO: 5.34 K/UL — SIGNIFICANT CHANGE UP (ref 3.8–10.5)

## 2024-02-05 PROCEDURE — 75574 CT ANGIO HRT W/3D IMAGE: CPT | Mod: 26

## 2024-02-05 PROCEDURE — 99233 SBSQ HOSP IP/OBS HIGH 50: CPT

## 2024-02-05 RX ORDER — POTASSIUM CHLORIDE 20 MEQ
10 PACKET (EA) ORAL ONCE
Refills: 0 | Status: COMPLETED | OUTPATIENT
Start: 2024-02-05 | End: 2024-02-05

## 2024-02-05 RX ORDER — METOPROLOL TARTRATE 50 MG
50 TABLET ORAL ONCE
Refills: 0 | Status: COMPLETED | OUTPATIENT
Start: 2024-02-05 | End: 2024-02-05

## 2024-02-05 RX ORDER — FUROSEMIDE 40 MG
20 TABLET ORAL ONCE
Refills: 0 | Status: COMPLETED | OUTPATIENT
Start: 2024-02-05 | End: 2024-02-05

## 2024-02-05 RX ADMIN — HEPARIN SODIUM 1000 UNIT(S)/HR: 5000 INJECTION INTRAVENOUS; SUBCUTANEOUS at 20:26

## 2024-02-05 RX ADMIN — HEPARIN SODIUM 3500 UNIT(S): 5000 INJECTION INTRAVENOUS; SUBCUTANEOUS at 17:30

## 2024-02-05 RX ADMIN — GABAPENTIN 300 MILLIGRAM(S): 400 CAPSULE ORAL at 22:21

## 2024-02-05 RX ADMIN — Medication 20 MILLIGRAM(S): at 10:19

## 2024-02-05 RX ADMIN — ZOLPIDEM TARTRATE 5 MILLIGRAM(S): 10 TABLET ORAL at 22:22

## 2024-02-05 RX ADMIN — HEPARIN SODIUM 800 UNIT(S)/HR: 5000 INJECTION INTRAVENOUS; SUBCUTANEOUS at 08:17

## 2024-02-05 RX ADMIN — Medication 50 MILLIGRAM(S): at 06:50

## 2024-02-05 RX ADMIN — Medication 10 MILLIEQUIVALENT(S): at 08:05

## 2024-02-05 RX ADMIN — Medication 50 MILLIGRAM(S): at 17:29

## 2024-02-05 RX ADMIN — HEPARIN SODIUM 800 UNIT(S)/HR: 5000 INJECTION INTRAVENOUS; SUBCUTANEOUS at 08:24

## 2024-02-05 RX ADMIN — Medication 81 MILLIGRAM(S): at 10:19

## 2024-02-05 RX ADMIN — HEPARIN SODIUM 800 UNIT(S)/HR: 5000 INJECTION INTRAVENOUS; SUBCUTANEOUS at 02:47

## 2024-02-05 RX ADMIN — HEPARIN SODIUM 800 UNIT(S)/HR: 5000 INJECTION INTRAVENOUS; SUBCUTANEOUS at 23:05

## 2024-02-05 RX ADMIN — Medication 50 MILLIGRAM(S): at 07:58

## 2024-02-05 RX ADMIN — ZOLPIDEM TARTRATE 5 MILLIGRAM(S): 10 TABLET ORAL at 23:29

## 2024-02-05 RX ADMIN — PANTOPRAZOLE SODIUM 40 MILLIGRAM(S): 20 TABLET, DELAYED RELEASE ORAL at 06:28

## 2024-02-05 RX ADMIN — Medication 1200 MILLIGRAM(S): at 17:30

## 2024-02-05 RX ADMIN — HEPARIN SODIUM 800 UNIT(S)/HR: 5000 INJECTION INTRAVENOUS; SUBCUTANEOUS at 10:18

## 2024-02-05 RX ADMIN — GABAPENTIN 300 MILLIGRAM(S): 400 CAPSULE ORAL at 06:29

## 2024-02-05 RX ADMIN — HEPARIN SODIUM 1000 UNIT(S)/HR: 5000 INJECTION INTRAVENOUS; SUBCUTANEOUS at 17:24

## 2024-02-05 RX ADMIN — GABAPENTIN 300 MILLIGRAM(S): 400 CAPSULE ORAL at 14:53

## 2024-02-05 RX ADMIN — Medication 1200 MILLIGRAM(S): at 06:30

## 2024-02-05 RX ADMIN — CITALOPRAM 40 MILLIGRAM(S): 10 TABLET, FILM COATED ORAL at 22:21

## 2024-02-05 RX ADMIN — BUPROPION HYDROCHLORIDE 300 MILLIGRAM(S): 150 TABLET, EXTENDED RELEASE ORAL at 10:18

## 2024-02-05 RX ADMIN — ATORVASTATIN CALCIUM 40 MILLIGRAM(S): 80 TABLET, FILM COATED ORAL at 22:22

## 2024-02-05 RX ADMIN — Medication 112 MICROGRAM(S): at 06:29

## 2024-02-05 NOTE — PROGRESS NOTE ADULT - PROBLEM SELECTOR PLAN 9
- LDL 52.   - CONT: Atorvastatin 40mg PO QD      F: None  E: Replete if K<4 or Mag<2  N: DASH Diet  GIppx: Pantoprazole   VTEppx: Heparin gtt   Dispo: pending clinical course

## 2024-02-05 NOTE — PROGRESS NOTE ADULT - PROBLEM SELECTOR PLAN 1
- 3 days of worsening exertional SOB + midsternal chest pressure; currently symptoms free.   - HS Trop T 6 > 6; EKG w/ AFib HR 100bpm.   - Received Lasix 20mg IV x1 on admission; euvolemic, no indication for further diuretics.  - Pt reports having normal outpatient stress test 10/2023.    - PLAN:   -TTE 2/3: normal LVSF with EF 60%, mildly dilated RV size, normal RVSF, severe biatrial enlargement, mod MR, severe TR, pHTN present with PASP 56mmHg, trivial pericardial effusion. 7 - 3 days of worsening exertional SOB + midsternal chest pressure; currently symptoms free.   - HS Trop T 6 > 6; EKG w/ AFib HR 100bpm.   - Received Lasix 20mg IV x1 on admission; euvolemic, no indication for further diuretics.  - Pt reports having normal outpatient stress test 10/2023.    - PLAN:   -TTE 2/3: normal LVSF with EF 60%, mildly dilated RV size, normal RVSF, severe biatrial enlargement, mod MR, severe TR, pHTN present with PASP 56mmHg, trivial pericardial effusion.  -CCTA today   - Lasix 20mg IVP x1 dose given this am-- reassess volume status for ?additional dosing - p/w 3 days of worsening exertional SOB + midsternal chest pressure; currently symptoms free.   - HS Trop T 6 > 6; EKG w/ AFib HR 100bpm.   - Received Lasix 20mg IV x1 on admission; remains on RA, WWP, in no distress   - Pt reports having normal outpatient stress test 10/2023.    - PLAN:   -TTE 2/3: normal LVSF with EF 60%, mildly dilated RV size, normal RVSF, severe biatrial enlargement, mod MR, severe TR, pHTN present with PASP 56mmHg, trivial pericardial effusion.  -structural consulted for severe TR   -CCTA today   - Lasix 20mg IVP x1 dose given this am-- reassess volume status for ?additional dosing

## 2024-02-05 NOTE — CONSULT NOTE ADULT - SUBJECTIVE AND OBJECTIVE BOX
INCOMPLETE NOTE  Surgeon: Dr. Guo    Requesting Physician: Dr. Moran    HISTORY OF PRESENT ILLNESS (Need 4):  Obtained from H&P: "73-year-old female patient with PMHx daily alcohol use (no hx hospitalizations/withdrawal), Leung's Esophagus, Colitis (on Mesalamine), Afib (on Eliquis), spinal stenosis, migraine headaches, anxiety/depression, anemia (unknown baseline hgb, required transfusion 2015), who presented to [facility] reporting two days of shortness of breath. Two days ago she was walking her dog as usual when she began experiencing shortness of breath which resolved with rest. Patient reports that last night while walking her dog she again felt short of breath with associated midsternal, constant chest pressure that also resolved with rest. Patient reports that today symptoms had resolved but the episode concerned her, prompting her to report to ED for evaluation. Patient is pain-free at this time and feels as though "[she] could get up and walk out." Patient denies current CP, current SOB, dizziness, palpitations, orthopnea/PND, leg swelling, LOC, bleeding, melena/hematochezia, fever, chills, URI symptoms, recent illness, recent ill contacts, recent prolonged travel, personal/family history of blood clots/clotting disorders, hx recent trauma/hospitalization/surgery, use of hormonal replacement therapy, or family hx of early CAD/sudden cardiac death. Patient denies sedentary lifestyle, states that she walks dog three times per day. Follows with cardiologist regularly; stress test within last two months reportedly normal per pt,"    Current HPI: Echo done this admission showing severe TR.  Dr. Guo being called for evaluation.  Currently Denies CP/SOB/N/V/D/dizziness/cough/fever/chills.  Pt denies any SOB at rest at the moment.        PAST MEDICAL & SURGICAL HISTORY:  Anxiety      Depression      Leung esophagus      Chronic atrial fibrillation      Colitis      Alcohol use      History of migraine headaches      Insomnia, unspecified      No significant past surgical history          MEDICATIONS  (STANDING):  aspirin enteric coated 81 milliGRAM(s) Oral daily  atorvastatin 40 milliGRAM(s) Oral at bedtime  buPROPion XL (24-Hour) . 300 milliGRAM(s) Oral daily  citalopram 40 milliGRAM(s) Oral daily  gabapentin 300 milliGRAM(s) Oral three times a day  heparin  Infusion.  Unit(s)/Hr (17 mL/Hr) IV Continuous <Continuous>  levothyroxine 112 MICROGram(s) Oral daily  mesalamine DR Capsule 1200 milliGRAM(s) Oral every 12 hours  metoprolol tartrate 50 milliGRAM(s) Oral two times a day  pantoprazole    Tablet 40 milliGRAM(s) Oral before breakfast    MEDICATIONS  (PRN):  heparin   Injectable 3500 Unit(s) IV Push every 6 hours PRN For aPTT between 40 - 57  heparin   Injectable 7500 Unit(s) IV Push every 6 hours PRN For aPTT less than 40  SUMAtriptan 50 milliGRAM(s) Oral daily PRN for headache  zolpidem 5 milliGRAM(s) Oral at bedtime PRN Insomnia  zolpidem 5 milliGRAM(s) Oral at bedtime PRN Insomnia      Allergies    shellfish (Flushing; Short breath; Urticaria)  No Known Drug Allergies  bee venom (Unknown)    Intolerances        SOCIAL HISTORY:  · Alcohol use	3 glasses of wine/night, no hx hospitalizations/withdrawal, last drink on 2/1/24 at 11PM  · Drug use	denies  · Tobacco use	denies      FAMILY HISTORY:  Family history of lung cancer (Mother)      Review of Systems (Need 10):  CONSTITUTIONAL: Denies fevers / chills, sweats, fatigue, weight loss, weight gain                                       NEURO:  Denies parathesias, seizures, syncope, confusion                                                                                  EYES:  Denies blurry vision, discharge, pain, loss of vision                                                                                    ENMT:  Denies difficulty hearing, vertigo, dysphagia, epistaxis, recent dental work                                       CV:  +QUIROS.  Denies chest pain, palpitations, orthopnea                                                                                           RESPIRATORY:  +QUIROS, +SOB.  Denies wWheezing, cough / sputum, hemoptysis                                                               GI:  Denies nausea, vomiting, diarrhea, constipation, melena                                                                          : Denies hematuria, dysuria, urgency, incontinence                                                                                          MUSKULOSKELETAL:  Denies arthritis, joint swelling, muscle weakness                                                             SKIN/BREAST:  Denies rash, itching, hair loss, masses                                                                                              PSYCH:  Denies depression, anxiety, suicidal ideation                                                                                                HEME/LYMPH:  Denies bruises easily, enlarged lymph nodes, tender lymph nodes                                          ENDOCRINE:  Denies cold intolerance, heat intolerance, polydipsia                                                                      Vital Signs Last 24 Hrs  T(C): 36.6 (05 Feb 2024 06:00), Max: 37.1 (04 Feb 2024 23:11)  T(F): 97.9 (05 Feb 2024 06:00), Max: 98.7 (04 Feb 2024 23:11)  HR: 69 (05 Feb 2024 08:18) (69 - 95)  BP: 132/75 (05 Feb 2024 08:18) (122/71 - 148/77)  BP(mean): 109 (05 Feb 2024 06:00) (94 - 109)  RR: 18 (05 Feb 2024 08:18) (18 - 18)  SpO2: 92% (05 Feb 2024 08:18) (91% - 96%)    Parameters below as of 05 Feb 2024 08:18  Patient On (Oxygen Delivery Method): room air        Physical Exam (Need 8)  GEN: NAD, looks comfortable  Psych: Mood appropriate  Neuro: A&Ox3.  No focal deficits.  Moving all extremities.   HEENT: No obvious abnormalities  CV: S1S2, regular, no murmurs appreciated.  No carotid bruits.  No JVD  Lungs: Clear B/L.  No wheezing, rales or rhonchi  ABD: Soft, non-tender, non-distended.  +Bowel sounds  EXT: Warm and well perfused.  No peripheral edema noted  Musculoskeletal: Moving all extremities with normal ROM, no joint swelling  PV: Pedal pulses palpable                                                            LABS:                        11.0   5.34  )-----------( 172      ( 05 Feb 2024 05:30 )             32.5     02-05    132<L>  |  99  |  10  ----------------------------<  106<H>  3.9   |  22  |  0.69    Ca    9.1      05 Feb 2024 05:30  Mg     2.0     02-05    TPro  6.4  /  Alb  3.8  /  TBili  0.8  /  DBili  x   /  AST  44<H>  /  ALT  30  /  AlkPhos  84  02-05    PTT - ( 05 Feb 2024 08:19 )  PTT:70.6 sec  Urinalysis Basic - ( 05 Feb 2024 05:30 )    Color: x / Appearance: x / SG: x / pH: x  Gluc: 106 mg/dL / Ketone: x  / Bili: x / Urobili: x   Blood: x / Protein: x / Nitrite: x   Leuk Esterase: x / RBC: x / WBC x   Sq Epi: x / Non Sq Epi: x / Bacteria: x      RADIOLOGY & ADDITIONAL STUDIES:  < from: Xray Chest 1 View-PORTABLE IMMEDIATE (Xray Chest 1 View-PORTABLE IMMEDIATE .) (02.02.24 @ 17:46) >    FINDINGS/  IMPRESSION: Heart size, mediastinal and hilar contours are within normal   limits. There are trace bibasilar pleural effusions versus pleural   thickening. No pneumothorax or consolidation. Soft tissues and osseous   structures are intact.    < end of copied text >    < from: TTE Echo Complete w/ Contrast w/o Doppler (02.03.24 @ 09:28) >  CONCLUSIONS:     1. Normal left ventricular size and systolic function.   2. Mildly dilated right ventricular size.   3. Normal right ventricular systolic function.   4. Severe biatrial enlargement.   5. Moderate mitral regurgitation.   6. Severe tricuspid regurgitation.   7. Pulmonary hypertension present, pulmonary artery systolic pressure is   56 mmHg.   8. Trivial pericardial effusion.   9. No prior echo is available for comparison.    < end of copied text >   Surgeon: Dr. Guo    Requesting Physician: Dr. Moran    HISTORY OF PRESENT ILLNESS (Need 4):  Obtained from H&P: "73-year-old female patient with PMHx daily alcohol use (no hx hospitalizations/withdrawal), Leung's Esophagus, Colitis (on Mesalamine), Afib (on Eliquis), spinal stenosis, migraine headaches, anxiety/depression, anemia (unknown baseline hgb, required transfusion 2015), who presented to [facility] reporting two days of shortness of breath. Two days ago she was walking her dog as usual when she began experiencing shortness of breath which resolved with rest. Patient reports that last night while walking her dog she again felt short of breath with associated midsternal, constant chest pressure that also resolved with rest. Patient reports that today symptoms had resolved but the episode concerned her, prompting her to report to ED for evaluation. Patient is pain-free at this time and feels as though "[she] could get up and walk out." Patient denies current CP, current SOB, dizziness, palpitations, orthopnea/PND, leg swelling, LOC, bleeding, melena/hematochezia, fever, chills, URI symptoms, recent illness, recent ill contacts, recent prolonged travel, personal/family history of blood clots/clotting disorders, hx recent trauma/hospitalization/surgery, use of hormonal replacement therapy, or family hx of early CAD/sudden cardiac death. Patient denies sedentary lifestyle, states that she walks dog three times per day. Follows with cardiologist regularly; stress test within last two months reportedly normal per pt,"    Current HPI: Confirmed above history, patient denies any recent travel or long car rides, no recent surgeries.  Had acute onset SOB last week that brought her to the hospital.  Echo done this admission showing severe TR.  Dr. Guo being called for evaluation.  Currently Denies CP/SOB/N/V/D/dizziness/cough/fever/chills.  Pt denies any SOB at rest at the moment.  Denies any current or past CP, or leg swelling.  Denies orthopnea.        PAST MEDICAL & SURGICAL HISTORY:  Anxiety      Depression      Leung esophagus      Chronic atrial fibrillation      Colitis      Alcohol use      History of migraine headaches      Insomnia, unspecified      No significant past surgical history          MEDICATIONS  (STANDING):  aspirin enteric coated 81 milliGRAM(s) Oral daily  atorvastatin 40 milliGRAM(s) Oral at bedtime  buPROPion XL (24-Hour) . 300 milliGRAM(s) Oral daily  citalopram 40 milliGRAM(s) Oral daily  gabapentin 300 milliGRAM(s) Oral three times a day  heparin  Infusion.  Unit(s)/Hr (17 mL/Hr) IV Continuous <Continuous>  levothyroxine 112 MICROGram(s) Oral daily  mesalamine DR Capsule 1200 milliGRAM(s) Oral every 12 hours  metoprolol tartrate 50 milliGRAM(s) Oral two times a day  pantoprazole    Tablet 40 milliGRAM(s) Oral before breakfast    MEDICATIONS  (PRN):  heparin   Injectable 3500 Unit(s) IV Push every 6 hours PRN For aPTT between 40 - 57  heparin   Injectable 7500 Unit(s) IV Push every 6 hours PRN For aPTT less than 40  SUMAtriptan 50 milliGRAM(s) Oral daily PRN for headache  zolpidem 5 milliGRAM(s) Oral at bedtime PRN Insomnia  zolpidem 5 milliGRAM(s) Oral at bedtime PRN Insomnia      Allergies    shellfish (Flushing; Short breath; Urticaria)  No Known Drug Allergies  bee venom (Unknown)    Intolerances        SOCIAL HISTORY:  · Alcohol use	3 glasses of wine/night, no hx hospitalizations/withdrawal, last drink on 2/1/24 at 11PM  · Drug use	denies  · Tobacco use	denies      FAMILY HISTORY:  Family history of lung cancer (Mother)      Review of Systems (Need 10):  CONSTITUTIONAL: Denies fevers / chills, sweats, fatigue, weight loss, weight gain                                       NEURO:  Denies parathesias, seizures, syncope, confusion                                                                                  EYES:  Denies blurry vision, discharge, pain, loss of vision                                                                                    ENMT:  Denies difficulty hearing, vertigo, dysphagia, epistaxis, recent dental work                                       CV:  +QUIROS.  Denies chest pain, palpitations, orthopnea                                                                                           RESPIRATORY:  +QUIROS, +SOB.  Denies wWheezing, cough / sputum, hemoptysis                                                               GI:  Denies nausea, vomiting, diarrhea, constipation, melena                                                                          : Denies hematuria, dysuria, urgency, incontinence                                                                                          MUSKULOSKELETAL:  Denies arthritis, joint swelling, muscle weakness                                                             SKIN/BREAST:  Denies rash, itching, hair loss, masses                                                                                              PSYCH:  Denies depression, anxiety, suicidal ideation                                                                                                HEME/LYMPH:  Denies bruises easily, enlarged lymph nodes, tender lymph nodes                                          ENDOCRINE:  Denies cold intolerance, heat intolerance, polydipsia                                                                      Vital Signs Last 24 Hrs  T(C): 36.6 (05 Feb 2024 06:00), Max: 37.1 (04 Feb 2024 23:11)  T(F): 97.9 (05 Feb 2024 06:00), Max: 98.7 (04 Feb 2024 23:11)  HR: 69 (05 Feb 2024 08:18) (69 - 95)  BP: 132/75 (05 Feb 2024 08:18) (122/71 - 148/77)  BP(mean): 109 (05 Feb 2024 06:00) (94 - 109)  RR: 18 (05 Feb 2024 08:18) (18 - 18)  SpO2: 92% (05 Feb 2024 08:18) (91% - 96%)    Parameters below as of 05 Feb 2024 08:18  Patient On (Oxygen Delivery Method): room air        Physical Exam (Need 8)  GEN: NAD, looks comfortable; Tall, pleasant female.    Psych: Mood appropriate  Neuro: A&Ox3.  No focal deficits.  Moving all extremities.   HEENT: No obvious abnormalities  CV: S1S2, regular, no murmurs appreciated.  No carotid bruits.  No JVD  Lungs: Slightly diminished at bases but no wheezing, rales or rhonchi  ABD: Soft, non-tender, non-distended.  +Bowel sounds  EXT: Warm and well perfused.  No peripheral edema noted  Musculoskeletal: Moving all extremities with normal ROM, no joint swelling  PV: Pedal pulses palpable                                                            LABS:                        11.0   5.34  )-----------( 172      ( 05 Feb 2024 05:30 )             32.5     02-05    132<L>  |  99  |  10  ----------------------------<  106<H>  3.9   |  22  |  0.69    Ca    9.1      05 Feb 2024 05:30  Mg     2.0     02-05    TPro  6.4  /  Alb  3.8  /  TBili  0.8  /  DBili  x   /  AST  44<H>  /  ALT  30  /  AlkPhos  84  02-05    PTT - ( 05 Feb 2024 08:19 )  PTT:70.6 sec  Urinalysis Basic - ( 05 Feb 2024 05:30 )    Color: x / Appearance: x / SG: x / pH: x  Gluc: 106 mg/dL / Ketone: x  / Bili: x / Urobili: x   Blood: x / Protein: x / Nitrite: x   Leuk Esterase: x / RBC: x / WBC x   Sq Epi: x / Non Sq Epi: x / Bacteria: x      RADIOLOGY & ADDITIONAL STUDIES:  < from: Xray Chest 1 View-PORTABLE IMMEDIATE (Xray Chest 1 View-PORTABLE IMMEDIATE .) (02.02.24 @ 17:46) >    FINDINGS/  IMPRESSION: Heart size, mediastinal and hilar contours are within normal   limits. There are trace bibasilar pleural effusions versus pleural   thickening. No pneumothorax or consolidation. Soft tissues and osseous   structures are intact.    < end of copied text >    < from: TTE Echo Complete w/ Contrast w/o Doppler (02.03.24 @ 09:28) >  CONCLUSIONS:     1. Normal left ventricular size and systolic function.   2. Mildly dilated right ventricular size.   3. Normal right ventricular systolic function.   4. Severe biatrial enlargement.   5. Moderate mitral regurgitation.   6. Severe tricuspid regurgitation.   7. Pulmonary hypertension present, pulmonary artery systolic pressure is   56 mmHg.   8. Trivial pericardial effusion.   9. No prior echo is available for comparison.    < end of copied text >

## 2024-02-05 NOTE — CONSULT NOTE ADULT - ASSESSMENT
Assesment:   "73-year-old female patient with PMHx daily alcohol use (no hx hospitalizations/withdrawal), Leung's Esophagus, Colitis (on Mesalamine), Afib (on Eliquis), spinal stenosis, migraine headaches, anxiety/depression, anemia (unknown baseline hgb, required transfusion 2015), who presented to [facility] reporting two days of shortness of breath. Two days ago she was walking her dog as usual when she began experiencing shortness of breath which resolved with rest. Patient reports that last night while walking her dog she again felt short of breath with associated midsternal, constant chest pressure that also resolved with rest. Patient reports that today symptoms had resolved but the episode concerned her, prompting her to report to ED for evaluation. Patient is pain-free at this time and feels as though "[she] could get up and walk out." Patient denies current CP, current SOB, dizziness, palpitations, orthopnea/PND, leg swelling, LOC, bleeding, melena/hematochezia, fever, chills, URI symptoms, recent illness, recent ill contacts, recent prolonged travel, personal/family history of blood clots/clotting disorders, hx recent trauma/hospitalization/surgery, use of hormonal replacement therapy, or family hx of early CAD/sudden cardiac death. Patient denies sedentary lifestyle, states that she walks dog three times per day. Follows with cardiologist regularly; stress test within last two months reportedly normal per pt,"    Current HPI: Echo done this admission showing severe TR.  Dr. Guo being called for evaluation.  Currently Denies CP/SOB/N/V/D/dizziness/cough/fever/chills.  Pt denies any SOB at rest at the moment.      Plan:  Problem 1: Moderate MR and severe TR on TTE done 2/3/24.  Being consulted for evaluation by structural heart.   Will review echo with Dr. Guo and provide further recs.  CXR looks overall stable with slight bibasilar haziness, possible small pleural effusions vs. pulmonary congestion.  Vitals are stable, most recently HR 72 afib, /75.  On beta blocker.  Continue w/ HR/BP control.  Will follow along.  DVT LE ruled out.  Plan is for calcium score scan today ordered per Cardiology team.       Problem 2: HLD.  Continue statin.      Problem 3: Colitis.  Continue w/ home medication      Problem 4: Afib, on heparin gtt.  PTT therapeutic.      I have reviewed clinical labs tests and reports, radiology tests and reports, as well as old patient medical records, and discussed with the refering physician.     73-year-old female patient with PMHx daily alcohol use (no hx hospitalizations/withdrawal), Leung's Esophagus, Colitis (on Mesalamine), Afib (on Eliquis), spinal stenosis, migraine headaches, anxiety/depression, anemia (unknown baseline hgb, required transfusion 2015), who presented to [facility] reporting two days of shortness of breath. Two days ago she was walking her dog as usual when she began experiencing shortness of breath which resolved with rest. Patient reports that last night while walking her dog she again felt short of breath with associated midsternal, constant chest pressure that also resolved with rest. Patient reports that today symptoms had resolved but the episode concerned her, prompting her to report to ED for evaluation. Patient is pain-free at this time and feels as though "[she] could get up and walk out." Patient denies current CP, current SOB, dizziness, palpitations, orthopnea/PND, leg swelling, LOC, bleeding, melena/hematochezia, fever, chills, URI symptoms, recent illness, recent ill contacts, recent prolonged travel, personal/family history of blood clots/clotting disorders, hx recent trauma/hospitalization/surgery, use of hormonal replacement therapy, or family hx of early CAD/sudden cardiac death. Patient denies sedentary lifestyle, states that she walks dog three times per day. Follows with cardiologist regularly; stress test within last two months reportedly normal per pt,"    Current HPI: Confirmed above history, patient denies any recent travel or long car rides, no recent surgeries.  Had acute onset SOB last week that brought her to the hospital.  Echo done this admission showing severe TR.  Dr. Guo being called for evaluation.  Currently Denies CP/SOB/N/V/D/dizziness/cough/fever/chills.  Pt denies any SOB at rest at the moment.  Denies any current or past CP, or leg swelling.  Denies orthopnea.        Plan:  Problem 1: Moderate MR and severe TR on TTE done 2/3/24.  Being consulted for evaluation by structural heart.   Will review echo with Dr. Guo and provide further recs.  CXR looks overall stable with slight bibasilar haziness, possible small pleural effusions vs. pulmonary congestion.  Vitals are stable, most recently HR 72 afib, /75.  On beta blocker.  Continue w/ HR/BP control.  Will follow along.  DVT LE ruled out.  Plan is for calcium score scan today ordered per Cardiology team.       Problem 2: HLD.  Continue statin.    Problem 3: Colitis.  Continue w/ home medication    Problem 4: Afib, on heparin gtt.  PTT therapeutic.      I have reviewed clinical labs tests and reports, radiology tests and reports, as well as old patient medical records, and discussed with the refering physician.     73-year-old female patient with PMHx daily alcohol use (no hx hospitalizations/withdrawal), Leung's Esophagus, Colitis (on Mesalamine), Afib (on Eliquis), spinal stenosis, migraine headaches, anxiety/depression, anemia (unknown baseline hgb, required transfusion 2015), who presented to [facility] reporting two days of shortness of breath. Two days ago she was walking her dog as usual when she began experiencing shortness of breath which resolved with rest. Patient reports that last night while walking her dog she again felt short of breath with associated midsternal, constant chest pressure that also resolved with rest. Patient reports that today symptoms had resolved but the episode concerned her, prompting her to report to ED for evaluation. Patient is pain-free at this time and feels as though "[she] could get up and walk out." Patient denies current CP, current SOB, dizziness, palpitations, orthopnea/PND, leg swelling, LOC, bleeding, melena/hematochezia, fever, chills, URI symptoms, recent illness, recent ill contacts, recent prolonged travel, personal/family history of blood clots/clotting disorders, hx recent trauma/hospitalization/surgery, use of hormonal replacement therapy, or family hx of early CAD/sudden cardiac death. Patient denies sedentary lifestyle, states that she walks dog three times per day. Follows with cardiologist regularly; stress test within last two months reportedly normal per pt,"    Current HPI: Confirmed above history, patient denies any recent travel or long car rides, no recent surgeries.  Had acute onset SOB last week that brought her to the hospital.  Echo done this admission showing severe TR.  Dr. Guo being called for evaluation.  Currently Denies CP/SOB/N/V/D/dizziness/cough/fever/chills.  Pt denies any SOB at rest at the moment.  Denies any current or past CP, or leg swelling.  Denies orthopnea.        Plan:  Problem 1: Moderate MR and severe TR on TTE done 2/3/24.  Being consulted for evaluation by structural heart.   Will review echo with Dr. Guo and provide further recs.  CXR looks overall stable with slight bibasilar haziness, possible small pleural effusions vs. pulmonary congestion.  Vitals are stable, most recently HR 72 afib, /75.  On beta blocker.  Continue w/ HR/BP control.  Will follow along.  DVT LE ruled out.  Plan is for calcium score scan today ordered per Cardiology team.  Addendum 6pm: Made rounds with Dr. Corea and Dr. Barnard.  Plan is to F/U results of calcium score. Cardioversion tomorrow for relatively new afib, per primary team.  Recommend ERASTO eventually to better assess the valves, but can be done out-patient.  Pt very anxious to leave and care for her dog.  The ERASTO is not urgent since she is not fluid overloaded and SOB has improved.  This can be arranged for out-patient, along with SHD follow up in the clinic.      Problem 2: HLD.  Continue statin.    Problem 3: Colitis.  Continue w/ home medication    Problem 4: Afib, on heparin gtt.  PTT therapeutic.  **Addendum at 6pm- cardiology team consulted EPS for cardioversion tomorrow.  GI cleared for A/C post cardioversion given h/o Barretts esophagus, note appreciated.      I have reviewed clinical labs tests and reports, radiology tests and reports, as well as old patient medical records, and discussed with the refering physician.     73-year-old female patient with PMHx daily alcohol use (no hx hospitalizations/withdrawal), Leung's Esophagus, Colitis (on Mesalamine), Afib (on Eliquis), spinal stenosis, migraine headaches, anxiety/depression, anemia (unknown baseline hgb, required transfusion 2015), who presented to [facility] reporting two days of shortness of breath. Two days ago she was walking her dog as usual when she began experiencing shortness of breath which resolved with rest. Patient reports that last night while walking her dog she again felt short of breath with associated midsternal, constant chest pressure that also resolved with rest. Patient reports that today symptoms had resolved but the episode concerned her, prompting her to report to ED for evaluation. Patient is pain-free at this time and feels as though "[she] could get up and walk out." Patient denies current CP, current SOB, dizziness, palpitations, orthopnea/PND, leg swelling, LOC, bleeding, melena/hematochezia, fever, chills, URI symptoms, recent illness, recent ill contacts, recent prolonged travel, personal/family history of blood clots/clotting disorders, hx recent trauma/hospitalization/surgery, use of hormonal replacement therapy, or family hx of early CAD/sudden cardiac death. Patient denies sedentary lifestyle, states that she walks dog three times per day. Follows with cardiologist regularly; stress test within last two months reportedly normal per pt,"    Current HPI: Confirmed above history, patient denies any recent travel or long car rides, no recent surgeries.  Had acute onset SOB last week that brought her to the hospital.  Echo done this admission showing severe TR.  Dr. Guo being called for evaluation.  Currently Denies CP/SOB/N/V/D/dizziness/cough/fever/chills.  Pt denies any SOB at rest at the moment.  Denies any current or past CP, or leg swelling.  Denies orthopnea.        Plan:  Problem 1: Moderate MR and severe TR on TTE done 2/3/24.  Being consulted for evaluation by structural heart.   Will review echo with Dr. Guo and provide further recs.  CXR looks overall stable with slight bibasilar haziness, possible small pleural effusions vs. pulmonary congestion.  Vitals are stable, most recently HR 72 afib, /75.  On beta blocker.  Continue w/ HR/BP control.  Will follow along.  DVT LE ruled out.  Plan is for calcium score scan today ordered per Cardiology team.  Addendum 6pm: Made rounds with Dr. Corea and Dr. Barnard.  Plan is to F/U results of calcium score. Cardioversion tomorrow for relatively new afib, per primary team.  Recommend ERASTO eventually to better assess the valves, but can be done out-patient.  Pt very anxious to leave and care for her dog.  The ERASTO is not urgent since she is not fluid overloaded and SOB has improved.  This can be arranged for out-patient, along with SHD follow up in the clinic.  And per Dr. Guo would also recommend Rt and Left heart cath and heart failure consult/follow up outpatient.     Problem 2: HLD.  Continue statin.    Problem 3: Colitis.  Continue w/ home medication    Problem 4: Afib, on heparin gtt.  PTT therapeutic.  **Addendum at 6pm- cardiology team consulted EPS for cardioversion tomorrow.  GI cleared for A/C post cardioversion given h/o Barretts esophagus, note appreciated.      I have reviewed clinical labs tests and reports, radiology tests and reports, as well as old patient medical records, and discussed with the refering physician.

## 2024-02-05 NOTE — PROGRESS NOTE ADULT - PROBLEM SELECTOR PLAN 2
- Recently Dx w/ AFib 10/2023; currently AFib w/ HR 80s.   - CONT: Heparin gtt and Lopressor 50mg PO BID (in case patient needs LHC following CCTA)

## 2024-02-05 NOTE — PROGRESS NOTE ADULT - SUBJECTIVE AND OBJECTIVE BOX
Interventional Cardiology PA Adult Progress Note    Subjective Assessment:    ROS negative except as noted above.  	  MEDICATIONS:  metoprolol tartrate 50 milliGRAM(s) Oral two times a day        buPROPion XL (24-Hour) . 300 milliGRAM(s) Oral daily  citalopram 40 milliGRAM(s) Oral daily  gabapentin 300 milliGRAM(s) Oral three times a day  SUMAtriptan 50 milliGRAM(s) Oral daily PRN  zolpidem 5 milliGRAM(s) Oral at bedtime PRN  zolpidem 5 milliGRAM(s) Oral at bedtime PRN    mesalamine DR Capsule 1200 milliGRAM(s) Oral every 12 hours  pantoprazole    Tablet 40 milliGRAM(s) Oral before breakfast    atorvastatin 40 milliGRAM(s) Oral at bedtime  levothyroxine 112 MICROGram(s) Oral daily    aspirin enteric coated 81 milliGRAM(s) Oral daily  heparin   Injectable 7500 Unit(s) IV Push every 6 hours PRN  heparin   Injectable 3500 Unit(s) IV Push every 6 hours PRN  heparin  Infusion.  Unit(s)/Hr IV Continuous <Continuous>      	    [PHYSICAL EXAM:  TELEMETRY:  T(C): 36.6 (02-05-24 @ 06:00), Max: 37.1 (02-04-24 @ 23:11)  HR: 69 (02-05-24 @ 08:18) (69 - 95)  BP: 132/75 (02-05-24 @ 08:18) (122/71 - 148/77)  RR: 18 (02-05-24 @ 08:18) (18 - 18)  SpO2: 92% (02-05-24 @ 08:18) (91% - 96%)  Wt(kg): --  I&O's Summary    04 Feb 2024 07:01  -  05 Feb 2024 07:00  --------------------------------------------------------  IN: 890 mL / OUT: 0 mL / NET: 890 mL    05 Feb 2024 07:01  -  05 Feb 2024 11:08  --------------------------------------------------------  IN: 0 mL / OUT: 0 mL / NET: 0 mL                                              Appearance: Normal	  HEENT:   Normal oral mucosa, PERRLA, EOMI	  Neck: Supple, + JVD/ - JVD; Carotid Bruit   Cardiovascular: Normal S1 S2, No JVD, No murmurs,   Respiratory: Lungs clear to auscultation/Decreased Breath Sounds/No Rales, Rhonchi, Wheezing	  Gastrointestinal:  Soft, Non-tender, + BS	  Skin: No rashes, No ecchymoses, No cyanosis  Extremities: Normal range of motion, No clubbing, cyanosis or edema  Vascular: Peripheral pulses palpable 2+ bilaterally  Neurologic: Non-focal  Psychiatry: A & O x 3, Mood & affect appropriate      	    ECG:  	  RADIOLOGY:   DIAGNOSTIC TESTING:  [ ] Echocardiogram:   [ ]  Catheterization:  [ ] Stress Test:    [ ] ERASTO  OTHER: 	    LABS:	 	  CARDIAC MARKERS:                                  11.0   5.34  )-----------( 172      ( 05 Feb 2024 05:30 )             32.5     02-05    132<L>  |  99  |  10  ----------------------------<  106<H>  3.9   |  22  |  0.69    Ca    9.1      05 Feb 2024 05:30  Mg     2.0     02-05    TPro  6.4  /  Alb  3.8  /  TBili  0.8  /  DBili  x   /  AST  44<H>  /  ALT  30  /  AlkPhos  84  02-05    proBNP:   Lipid Profile:   HgA1c:   TSH:   PTT - ( 05 Feb 2024 08:19 )  PTT:70.6 sec Cardiology PA Adult Progress Note    Subjective Assessment: Patient seen and examined by bedside. No acute events overnight. Patient wishes to go home at this time. Patient denies chest pain, SOB, QUIROS, palpitations, dizziness, LOC, N/V/D, fever/chills/sick contact, diaphoresis, orthopnea/PND, and leg swelling.     ROS negative except as noted above.  	  MEDICATIONS:  metoprolol tartrate 50 milliGRAM(s) Oral two times a day  buPROPion XL (24-Hour) . 300 milliGRAM(s) Oral daily  citalopram 40 milliGRAM(s) Oral daily  gabapentin 300 milliGRAM(s) Oral three times a day  SUMAtriptan 50 milliGRAM(s) Oral daily PRN  zolpidem 5 milliGRAM(s) Oral at bedtime PRN  zolpidem 5 milliGRAM(s) Oral at bedtime PRN  mesalamine DR Capsule 1200 milliGRAM(s) Oral every 12 hours  pantoprazole    Tablet 40 milliGRAM(s) Oral before breakfast  atorvastatin 40 milliGRAM(s) Oral at bedtime  levothyroxine 112 MICROGram(s) Oral daily  aspirin enteric coated 81 milliGRAM(s) Oral daily  heparin   Injectable 7500 Unit(s) IV Push every 6 hours PRN  heparin   Injectable 3500 Unit(s) IV Push every 6 hours PRN  heparin  Infusion.  Unit(s)/Hr IV Continuous <Continuous>      	    [PHYSICAL EXAM:  TELEMETRY:  T(C): 36.6 (02-05-24 @ 06:00), Max: 37.1 (02-04-24 @ 23:11)  HR: 69 (02-05-24 @ 08:18) (69 - 95)  BP: 132/75 (02-05-24 @ 08:18) (122/71 - 148/77)  RR: 18 (02-05-24 @ 08:18) (18 - 18)  SpO2: 92% (02-05-24 @ 08:18) (91% - 96%)  Wt(kg): --  I&O's Summary    04 Feb 2024 07:01  -  05 Feb 2024 07:00  --------------------------------------------------------  IN: 890 mL / OUT: 0 mL / NET: 890 mL    05 Feb 2024 07:01  -  05 Feb 2024 11:08  --------------------------------------------------------  IN: 0 mL / OUT: 0 mL / NET: 0 mL                                              Appearance: Normal	  HEENT:   Normal oral mucosa, PERRLA, EOMI	  Neck: Supple, + JVD/ - JVD; Carotid Bruit   Cardiovascular: Normal S1 S2, No JVD, No murmurs,   Respiratory: Lungs clear to auscultation/Decreased Breath Sounds/No Rales, Rhonchi, Wheezing	  Gastrointestinal:  Soft, Non-tender, + BS	  Skin: No rashes, No ecchymoses, No cyanosis  Extremities: Normal range of motion, No clubbing, cyanosis or edema  Vascular: Peripheral pulses palpable 2+ bilaterally  Neurologic: Non-focal  Psychiatry: A & O x 3, Mood & affect appropriate      	    ECG:  	  RADIOLOGY:   DIAGNOSTIC TESTING:  [ ] Echocardiogram:   [ ]  Catheterization:  [ ] Stress Test:    [ ] ERASTO  OTHER: 	    LABS:	 	  CARDIAC MARKERS:                          11.0   5.34  )-----------( 172      ( 05 Feb 2024 05:30 )             32.5     02-05    132<L>  |  99  |  10  ----------------------------<  106<H>  3.9   |  22  |  0.69    Ca    9.1      05 Feb 2024 05:30  Mg     2.0     02-05    TPro  6.4  /  Alb  3.8  /  TBili  0.8  /  DBili  x   /  AST  44<H>  /  ALT  30  /  AlkPhos  84  02-05    proBNP:   Lipid Profile:   HgA1c:   TSH:   PTT - ( 05 Feb 2024 08:19 )  PTT:70.6 sec Cardiology PA Adult Progress Note    Subjective Assessment: Patient seen and examined by bedside. No acute events overnight. Patient wishes to go home at this time. Patient denies chest pain, SOB, QUIROS, palpitations, dizziness, LOC, N/V/D, fever/chills/sick contact, diaphoresis, orthopnea/PND, and leg swelling.     ROS negative except as noted above.  	  MEDICATIONS:  metoprolol tartrate 50 milliGRAM(s) Oral two times a day  buPROPion XL (24-Hour) . 300 milliGRAM(s) Oral daily  citalopram 40 milliGRAM(s) Oral daily  gabapentin 300 milliGRAM(s) Oral three times a day  SUMAtriptan 50 milliGRAM(s) Oral daily PRN  zolpidem 5 milliGRAM(s) Oral at bedtime PRN  zolpidem 5 milliGRAM(s) Oral at bedtime PRN  mesalamine DR Capsule 1200 milliGRAM(s) Oral every 12 hours  pantoprazole    Tablet 40 milliGRAM(s) Oral before breakfast  atorvastatin 40 milliGRAM(s) Oral at bedtime  levothyroxine 112 MICROGram(s) Oral daily  aspirin enteric coated 81 milliGRAM(s) Oral daily  heparin   Injectable 7500 Unit(s) IV Push every 6 hours PRN  heparin   Injectable 3500 Unit(s) IV Push every 6 hours PRN  heparin  Infusion.  Unit(s)/Hr IV Continuous <Continuous>      	    [PHYSICAL EXAM:  TELEMETRY:  T(C): 36.6 (02-05-24 @ 06:00), Max: 37.1 (02-04-24 @ 23:11)  HR: 69 (02-05-24 @ 08:18) (69 - 95)  BP: 132/75 (02-05-24 @ 08:18) (122/71 - 148/77)  RR: 18 (02-05-24 @ 08:18) (18 - 18)  SpO2: 92% (02-05-24 @ 08:18) (91% - 96%)  Wt(kg): --  I&O's Summary    04 Feb 2024 07:01  -  05 Feb 2024 07:00  --------------------------------------------------------  IN: 890 mL / OUT: 0 mL / NET: 890 mL    05 Feb 2024 07:01  -  05 Feb 2024 11:08  --------------------------------------------------------  IN: 0 mL / OUT: 0 mL / NET: 0 mL                                              Appearance: Normal	  HEENT:   Normal oral mucosa, PERRLA, EOMI	  Neck: Supple,  - JVD; Carotid Bruit   Cardiovascular: Normal S1 S2, No JVD, No murmurs,   Respiratory: Lungs clear to auscultation  Gastrointestinal:  Soft, Non-tender, + BS	  Skin: No rashes, No ecchymoses, No cyanosis  Extremities: Normal range of motion, No clubbing, cyanosis or edema  Vascular: Peripheral pulses palpable 2+ bilaterally  Neurologic: Non-focal  Psychiatry: A & O x 3, Mood & affect appropriate      	    ECG:  	  RADIOLOGY:   DIAGNOSTIC TESTING:  [ ] Echocardiogram:   [ ]  Catheterization:  [ ] Stress Test:    [ ] ERASTO  OTHER: 	    LABS:	 	  CARDIAC MARKERS:                          11.0   5.34  )-----------( 172      ( 05 Feb 2024 05:30 )             32.5     02-05    132<L>  |  99  |  10  ----------------------------<  106<H>  3.9   |  22  |  0.69    Ca    9.1      05 Feb 2024 05:30  Mg     2.0     02-05    TPro  6.4  /  Alb  3.8  /  TBili  0.8  /  DBili  x   /  AST  44<H>  /  ALT  30  /  AlkPhos  84  02-05    proBNP:   Lipid Profile:   HgA1c:   TSH:   PTT - ( 05 Feb 2024 08:19 )  PTT:70.6 sec

## 2024-02-05 NOTE — PROGRESS NOTE ADULT - ASSESSMENT
73-year-old female patient with PMHx daily alcohol use (no hx hospitalizations/withdrawal), Leung's Esophagus, Colitis (on Mesalamine), Afib (on Eliquis), spinal stenosis, migraine headaches, anxiety/depression, anemia (unknown baseline hgb, required transfusion 2015), with two days of exertional SOB/chest pressure. TTE revealed severe TR. Plan for ischemic eval today  with CCTA and Structural consulted.

## 2024-02-06 ENCOUNTER — TRANSCRIPTION ENCOUNTER (OUTPATIENT)
Age: 74
End: 2024-02-06

## 2024-02-06 LAB
ANION GAP SERPL CALC-SCNC: 10 MMOL/L — SIGNIFICANT CHANGE UP (ref 5–17)
APTT BLD: 61.4 SEC — HIGH (ref 24.5–35.6)
APTT BLD: 65.8 SEC — HIGH (ref 24.5–35.6)
BUN SERPL-MCNC: 8 MG/DL — SIGNIFICANT CHANGE UP (ref 7–23)
CALCIUM SERPL-MCNC: 9.2 MG/DL — SIGNIFICANT CHANGE UP (ref 8.4–10.5)
CHLORIDE SERPL-SCNC: 99 MMOL/L — SIGNIFICANT CHANGE UP (ref 96–108)
CO2 SERPL-SCNC: 25 MMOL/L — SIGNIFICANT CHANGE UP (ref 22–31)
CREAT SERPL-MCNC: 0.72 MG/DL — SIGNIFICANT CHANGE UP (ref 0.5–1.3)
EGFR: 88 ML/MIN/1.73M2 — SIGNIFICANT CHANGE UP
GLUCOSE SERPL-MCNC: 103 MG/DL — HIGH (ref 70–99)
HCT VFR BLD CALC: 32.2 % — LOW (ref 34.5–45)
HGB BLD-MCNC: 11 G/DL — LOW (ref 11.5–15.5)
MAGNESIUM SERPL-MCNC: 2.1 MG/DL — SIGNIFICANT CHANGE UP (ref 1.6–2.6)
MCHC RBC-ENTMCNC: 34.2 GM/DL — SIGNIFICANT CHANGE UP (ref 32–36)
MCHC RBC-ENTMCNC: 34.8 PG — HIGH (ref 27–34)
MCV RBC AUTO: 101.9 FL — HIGH (ref 80–100)
NRBC # BLD: 0 /100 WBCS — SIGNIFICANT CHANGE UP (ref 0–0)
PLATELET # BLD AUTO: 160 K/UL — SIGNIFICANT CHANGE UP (ref 150–400)
POTASSIUM SERPL-MCNC: 3.8 MMOL/L — SIGNIFICANT CHANGE UP (ref 3.5–5.3)
POTASSIUM SERPL-SCNC: 3.8 MMOL/L — SIGNIFICANT CHANGE UP (ref 3.5–5.3)
RBC # BLD: 3.16 M/UL — LOW (ref 3.8–5.2)
RBC # FLD: 13.4 % — SIGNIFICANT CHANGE UP (ref 10.3–14.5)
SODIUM SERPL-SCNC: 134 MMOL/L — LOW (ref 135–145)
WBC # BLD: 5.06 K/UL — SIGNIFICANT CHANGE UP (ref 3.8–10.5)
WBC # FLD AUTO: 5.06 K/UL — SIGNIFICANT CHANGE UP (ref 3.8–10.5)

## 2024-02-06 PROCEDURE — 99222 1ST HOSP IP/OBS MODERATE 55: CPT | Mod: GC

## 2024-02-06 PROCEDURE — 93010 ELECTROCARDIOGRAM REPORT: CPT | Mod: 76

## 2024-02-06 PROCEDURE — 92960 CARDIOVERSION ELECTRIC EXT: CPT

## 2024-02-06 PROCEDURE — 99233 SBSQ HOSP IP/OBS HIGH 50: CPT

## 2024-02-06 PROCEDURE — 99223 1ST HOSP IP/OBS HIGH 75: CPT

## 2024-02-06 RX ORDER — APIXABAN 2.5 MG/1
1 TABLET, FILM COATED ORAL
Refills: 0 | DISCHARGE

## 2024-02-06 RX ORDER — PANTOPRAZOLE SODIUM 20 MG/1
1 TABLET, DELAYED RELEASE ORAL
Qty: 30 | Refills: 0
Start: 2024-02-06 | End: 2024-03-06

## 2024-02-06 RX ORDER — APIXABAN 2.5 MG/1
1 TABLET, FILM COATED ORAL
Qty: 60 | Refills: 0
Start: 2024-02-06 | End: 2024-03-06

## 2024-02-06 RX ORDER — METOPROLOL TARTRATE 50 MG
1 TABLET ORAL
Qty: 60 | Refills: 0
Start: 2024-02-06 | End: 2024-03-06

## 2024-02-06 RX ORDER — APIXABAN 2.5 MG/1
5 TABLET, FILM COATED ORAL EVERY 12 HOURS
Refills: 0 | Status: DISCONTINUED | OUTPATIENT
Start: 2024-02-06 | End: 2024-02-07

## 2024-02-06 RX ORDER — POTASSIUM CHLORIDE 20 MEQ
20 PACKET (EA) ORAL ONCE
Refills: 0 | Status: COMPLETED | OUTPATIENT
Start: 2024-02-06 | End: 2024-02-06

## 2024-02-06 RX ORDER — METOPROLOL TARTRATE 50 MG
1 TABLET ORAL
Refills: 0 | DISCHARGE

## 2024-02-06 RX ORDER — PANTOPRAZOLE SODIUM 20 MG/1
1 TABLET, DELAYED RELEASE ORAL
Refills: 0 | DISCHARGE

## 2024-02-06 RX ADMIN — PANTOPRAZOLE SODIUM 40 MILLIGRAM(S): 20 TABLET, DELAYED RELEASE ORAL at 07:25

## 2024-02-06 RX ADMIN — GABAPENTIN 300 MILLIGRAM(S): 400 CAPSULE ORAL at 21:20

## 2024-02-06 RX ADMIN — GABAPENTIN 300 MILLIGRAM(S): 400 CAPSULE ORAL at 17:30

## 2024-02-06 RX ADMIN — Medication 81 MILLIGRAM(S): at 11:31

## 2024-02-06 RX ADMIN — HEPARIN SODIUM 800 UNIT(S)/HR: 5000 INJECTION INTRAVENOUS; SUBCUTANEOUS at 08:20

## 2024-02-06 RX ADMIN — HEPARIN SODIUM 800 UNIT(S)/HR: 5000 INJECTION INTRAVENOUS; SUBCUTANEOUS at 12:01

## 2024-02-06 RX ADMIN — Medication 50 MILLIGRAM(S): at 07:26

## 2024-02-06 RX ADMIN — ZOLPIDEM TARTRATE 5 MILLIGRAM(S): 10 TABLET ORAL at 23:34

## 2024-02-06 RX ADMIN — Medication 1200 MILLIGRAM(S): at 17:30

## 2024-02-06 RX ADMIN — BUPROPION HYDROCHLORIDE 300 MILLIGRAM(S): 150 TABLET, EXTENDED RELEASE ORAL at 11:31

## 2024-02-06 RX ADMIN — GABAPENTIN 300 MILLIGRAM(S): 400 CAPSULE ORAL at 07:25

## 2024-02-06 RX ADMIN — ATORVASTATIN CALCIUM 40 MILLIGRAM(S): 80 TABLET, FILM COATED ORAL at 21:20

## 2024-02-06 RX ADMIN — Medication 112 MICROGRAM(S): at 05:00

## 2024-02-06 RX ADMIN — Medication 50 MILLIGRAM(S): at 18:11

## 2024-02-06 RX ADMIN — CITALOPRAM 40 MILLIGRAM(S): 10 TABLET, FILM COATED ORAL at 21:21

## 2024-02-06 RX ADMIN — APIXABAN 5 MILLIGRAM(S): 2.5 TABLET, FILM COATED ORAL at 20:25

## 2024-02-06 RX ADMIN — HEPARIN SODIUM 800 UNIT(S)/HR: 5000 INJECTION INTRAVENOUS; SUBCUTANEOUS at 05:42

## 2024-02-06 RX ADMIN — Medication 1200 MILLIGRAM(S): at 07:25

## 2024-02-06 RX ADMIN — Medication 20 MILLIEQUIVALENT(S): at 12:42

## 2024-02-06 NOTE — CONSULT NOTE ADULT - SUBJECTIVE AND OBJECTIVE BOX
HPI:  73-year-old female patient with daily alcohol use (no hx hospitalizations/withdrawal), Leung's Esophagus (history of bleed 2015 - no issues since treatment), Colitis (on Mesalamine), Afib (on Eliquis), spinal stenosis, migraine headaches, anxiety/depression, anemia, who was admitted to [facility] with SOB and chest pain.    She came to the ER complaining of 2 days of shortness of breath as well as midsternal chest pressure 2/2/2024.  Echo with MR/TR/pulmonary HTN. She was seen by structural with plans for an outpatient work up.  Seen by Gi and cleared to go back on eliquis.  Cta with no significant CAD and cleared appendage yesterday (and on heparin).      She was first diagnosed with afib [id number] when she went for pre-op clearance for back surgery.  She at that time notes no chest pain, SOB or increased fatigue.  She has been compliant with eliquis 2x a day since then.  No palpitations, orthopnea, pnd, syncope or near syncope.    PAST MEDICAL & SURGICAL HISTORY:  Anxiety  Depression  Leung esophagus  Chronic atrial fibrillation  Colitis  Alcohol use  History of migraine headaches  Insomnia, unspecified      No significant past surgical history        Family history of lung cancer (Mother)        Social History:no smoking, no drugs +algohol    pertinent home medications:  · 	pantoprazole 40 mg oral delayed release tablet:   · 	levothyroxine 112 mcg (0.112 mg) oral capsule  · 	naproxen 250 mg oral tablet: L  · 	gabapentin 300 mg oral capsule:  · 	citalopram 40 mg oral tablet:  · 	buPROPion 24 hour extended release:  · 	SUMAtriptan 50 mg oral tablet:  · 	LORazepam 0.5 mg oral tablet:   · 	zolpidem 10 mg oral tablet:   · 	atorvastatin 40 mg oral tablet:  · 	metoprolol succinate 25 mg oral capsule, extended release:   · 	mesalamine 1.2 g oral delayed release tablet:   · 	Eliquis 5 mg oral tablet: 1 tab(s) orally every 12 hours    Inpatient Medications:   aspirin enteric coated 81 milliGRAM(s) Oral daily  atorvastatin 40 milliGRAM(s) Oral at bedtime  buPROPion XL (24-Hour) . 300 milliGRAM(s) Oral daily  citalopram 40 milliGRAM(s) Oral daily  gabapentin 300 milliGRAM(s) Oral three times a day  heparin  Infusion.  Unit(s)/Hr IV Continuous <Continuous>  levothyroxine 112 MICROGram(s) Oral daily  mesalamine DR Capsule 1200 milliGRAM(s) Oral every 12 hours  metoprolol tartrate 50 milliGRAM(s) Oral two times a day  pantoprazole    Tablet 40 milliGRAM(s) Oral before breakfast  potassium chloride    Tablet ER 20 milliEquivalent(s) Oral once  SUMAtriptan 50 milliGRAM(s) Oral daily PRN  zolpidem 5 milliGRAM(s) Oral at bedtime PRN      Allergies:   shellfish (Flushing; Short breath; Urticaria)  No Known Drug Allergies  bee venom (Unknown)      ROS:   CONSTITUTIONAL: No fever, weight loss + fatigue  EYES: Pt denies  RESPIRATORY: No cough, wheezing, chills or hemoptysis; No Shortness of Breath  CARDIOVASCULAR: see HPI  GASTROINTESTINAL: Pt denies  NEUROLOGICAL: Pt denies  SKIN: Pt denies   PSYCHIATRIC: Pt denies  HEME/LYMPH: Pt denies    PHYSICAL:  T(C): 36.5 (02-06-24 @ 08:57), Max: 37 (02-05-24 @ 21:22)  HR: 73 (02-06-24 @ 08:57) (69 - 82)  BP: 126/66 (02-06-24 @ 08:57) (122/66 - 141/70)  RR: 18 (02-06-24 @ 08:57) (18 - 18)  SpO2: 93% (02-06-24 @ 08:57) (93% - 96%)    Appearance: No acute distress, well developed  Eyes: normal appearing conjunctiva, pupils and eyelids  Cardiovascular: irregularly irregular normal rate  Respiratory: Lungs clear to auscultation	bilaterally.  No wheeze, rhonchi, rales noted  Gastrointestinal:  Soft, NT/ND 	  Neurologic:  No deficit noted  Psych: A&Ox3, normal mood/affect  Musculoskeletal: no deformities noted  Skin: no rash noted, normal color and pigmentation.        LABS:                        11.0   5.06  )-----------( 160      ( 06 Feb 2024 04:35 )             32.2       134<L>  |  99  |  8   ----------------------------<  103<H>  3.8   |  25  |  0.72    Ca    9.2   Mg     2.1    TPro  6.4  /  Alb  3.8  /  TBili  0.8  /  DBili  x   /  AST  44<H>  /  ALT  30  /  AlkPhos  84    PTT - ( 06 Feb 2024 04:35 )  PTT:65.8 sec  TSH 3.76      EKG: in ER coarse afib rate 85    Telemetry: afib rates     ECHO:  (02.03.24 @ 09:28) >   1. Normal left ventricular size and systolic function.   2. Mildly dilated right ventricular size.   3. Normal right ventricular systolic function.   4. Severe biatrial enlargement.   5. Moderate mitral regurgitation.   6. Severe tricuspid regurgitation.   7. Pulmonary hypertension present, pulmonary artery systolic pressure is 56 mmHg.   8. Trivial pericardial effusion.   9. No prior echo is available for comparison.        Prior EP procedures: denies    Cath / stress / Cardiac CTa:   (02.05.24 @ 11:21) >  1.  The calcium score is moderate at 105 Agatston units, which is at the   64 percentile, adjusted for age, gender and race.  2.  Non-obstructive coronary artery disease.  3.  No left atrial or left atrial appendage thrombus.           Assessment Plan:  73-year-old female patient with daily alcohol use (no hx hospitalizations/withdrawal), Leung's Esophagus (history of bleed 2015 - no issues since treatment), Colitis (on Mesalamine), Afib (on Eliquis), spinal stenosis, migraine headaches, anxiety/depression, anemia, who was admitted to [facility] with SOB and chest pain.  We discussed the normal conduction system of the heart and what afib is.  We discussed the association with stroke, sleep apnea and alcohol use.  No sig snoring.  We discussed the association with stroke.  CHADSVASC score is at least 2 and she is on eliquis - cleared to restart by GI.  Appendaged cleared by CTa yesterday. Symptoms unlikely from afib - rate controlled and she has not had any symptom change pre/post diagnosis in september 2023.  Though this is relatively newly diagnosed and recommend a cardioversion. LOLI mariscal discussed this is unlikely a permanent solution - but will help us figure out if her symptoms are from afib or not.  She is agreeable to a cardioversion and we discussed the procedure in detail including risks, benefits and alternatives  She needs to be home today and knows she will need an escort home.  Primary team made aware.  Will try and coordinate DCCV early afternoon.  She can follow up with Dr. Cardona [phone] in 3-4 weeks

## 2024-02-06 NOTE — PROGRESS NOTE ADULT - ASSESSMENT
73-year-old female patient with PMHx daily alcohol use (no hx hospitalizations/withdrawal), Leung's Esophagus, Colitis (on Mesalamine), Afib (on Eliquis), spinal stenosis, migraine headaches, anxiety/depression, anemia (unknown baseline hgb, required transfusion 2015), with two days of exertional SOB/chest pressure. TTE revealed severe TR. Plan for ischemic eval today  with CCTA and Structural consulted.                    73-year-old female patient with PMHx daily alcohol use (no hx hospitalizations/withdrawal), Leung's Esophagus, Colitis (on Mesalamine), Afib (on Eliquis), spinal stenosis, migraine headaches, anxiety/depression, anemia (unknown baseline hgb, required transfusion 2015), with two days of exertional SOB/chest pressure. TTE revealed severe TR. s/p success DCCV. Post EKG patient confrimed to be in NSR and patient was set for discharge however tele called 7pm saying patient was in junctional rhythm. EKG obtained showing patient to be in NSR with 1st degree AV block and with PACs. VSS, patient asymptomatic. Patient agreeable to stay overnight for observation and will hold BB this evening. Eliquis will  resume this evening.

## 2024-02-06 NOTE — DISCHARGE NOTE PROVIDER - NSDCMRMEDTOKEN_GEN_ALL_CORE_FT
atorvastatin 40 mg oral tablet: 1 tab(s) orally once a day (at bedtime)  buPROPion 24 hour extended release: 300 milligram(s) orally once a day  citalopram 40 mg oral tablet: 1 tab(s) orally once a day  Eliquis 5 mg oral tablet: 1 tab(s) orally every 12 hours  gabapentin 300 mg oral capsule: 1 cap(s) orally 3 times a day  levothyroxine 112 mcg (0.112 mg) oral capsule: 1 cap(s) orally once a day  LORazepam 0.5 mg oral tablet: 1 tab(s) orally once a day as needed for  anxiety  mesalamine 1.2 g oral delayed release tablet: 2 tab(s) orally once a day  metoprolol succinate 25 mg oral capsule, extended release: 1 cap(s) orally once a day  naproxen 250 mg oral tablet: 1 tab(s) orally once a day  pantoprazole 40 mg oral delayed release tablet: 1 tab(s) orally once a day  SUMAtriptan 50 mg oral tablet: 1 tab(s) orally once a day as needed for  headache  zolpidem 10 mg oral tablet: 1 tab(s) orally once a day (at bedtime)   atorvastatin 40 mg oral tablet: 1 tab(s) orally once a day (at bedtime)  buPROPion 24 hour extended release: 300 milligram(s) orally once a day  citalopram 40 mg oral tablet: 1 tab(s) orally once a day  Eliquis 5 mg oral tablet: 1 tab(s) orally every 12 hours  gabapentin 300 mg oral capsule: 1 cap(s) orally 3 times a day  levothyroxine 112 mcg (0.112 mg) oral capsule: 1 cap(s) orally once a day  LORazepam 0.5 mg oral tablet: 1 tab(s) orally once a day as needed for  anxiety  mesalamine 1.2 g oral delayed release tablet: 2 tab(s) orally once a day  Metoprolol Tartrate 50 mg oral tablet: 1 tab(s) orally 2 times a day  naproxen 250 mg oral tablet: 1 tab(s) orally once a day  pantoprazole 40 mg oral delayed release tablet: 1 tab(s) orally once a day  SUMAtriptan 50 mg oral tablet: 1 tab(s) orally once a day as needed for  headache  zolpidem 10 mg oral tablet: 1 tab(s) orally once a day (at bedtime)   atorvastatin 40 mg oral tablet: 1 tab(s) orally once a day (at bedtime)  buPROPion 24 hour extended release: 300 milligram(s) orally once a day  citalopram 40 mg oral tablet: 1 tab(s) orally once a day  Eliquis 5 mg oral tablet: 1 tab(s) orally every 12 hours  gabapentin 300 mg oral capsule: 1 cap(s) orally 3 times a day  levothyroxine 112 mcg (0.112 mg) oral capsule: 1 cap(s) orally once a day  LORazepam 0.5 mg oral tablet: 1 tab(s) orally once a day as needed for  anxiety  mesalamine 1.2 g oral delayed release tablet: 2 tab(s) orally once a day  naproxen 250 mg oral tablet: 1 tab(s) orally once a day  pantoprazole 40 mg oral delayed release tablet: 1 tab(s) orally once a day  SUMAtriptan 50 mg oral tablet: 1 tab(s) orally once a day as needed for  headache  zolpidem 10 mg oral tablet: 1 tab(s) orally once a day (at bedtime)

## 2024-02-06 NOTE — PROGRESS NOTE ADULT - NSPROGADDITIONALINFOA_GEN_ALL_CORE
Attending Attestation:  I was physically present for the key portions of the evaluation and management (E/M) service provided.  I agree with the above history, physical, and plan which I have reviewed with the following edits/addendum:    73F w Leung's esophagus, colitis, spinal stenosis (declined surgery last Sept) who p/w 3 wk worsening QUIROS. Was told she was not cleared for surgery back in Sept 2023, unclear if was in Afib then. Saw Cardiologist Dr Alvarado ([facility]) Had an endoscopy in May 2023 (GI, [facility])    Volume overload in setting of rate-acceptable Afib  - recommend GI evaluation first prior to CVN  - CTA ruled out obstructive CAD (Ca score 105), no IGGY thrombus  - encouraged to ambulate and will need more diuresis if still dyspneic  - obtain records from cardiologist (ek[facility] in Sept 2023)    Urinary sx - check UA, tx if with UTI    Jose Gaines MD  Cardiology
Attending Attestation:  I was physically present for the key portions of the evaluation and management (E/M) service provided.  I agree with the above history, physical, and plan which I have reviewed with the following edits/addendum:    - noted to be bradycardic with symptoms post cvn. hold beta blockers and observe overnight.    Jose Gaines MD  Cardiology

## 2024-02-06 NOTE — PROGRESS NOTE ADULT - PROBLEM SELECTOR PLAN 1
- p/w 3 days of worsening exertional SOB + midsternal chest pressure; currently symptoms free.   - HS Trop T 6 > 6; EKG w/ AFib HR 100bpm.   - Received Lasix 20mg IV x1 on admission; remains on RA, WWP, in no distress   - Pt reports having normal outpatient stress test 10/2023.    - PLAN:   -TTE 2/3: normal LVSF with EF 60%, mildly dilated RV size, normal RVSF, severe biatrial enlargement, mod MR, severe TR, pHTN present with PASP 56mmHg, trivial pericardial effusion.  -structural consulted for severe TR   -CCTA today   - Lasix 20mg IVP x1 dose given this am-- reassess volume status for ?additional dosing - p/w 3 days of worsening exertional SOB + midsternal chest pressure; currently symptoms free.   - HS Trop T 6 > 6; EKG w/ AFib HR 100bpm.   - Received Lasix 20mg IV x1 on admission; remains on RA, WWP, in no distress   - Pt reports having normal outpatient stress test 10/2023.    - PLAN:   -TTE 2/3: normal LVSF with EF 60%, mildly dilated RV size, normal RVSF, severe biatrial enlargement, mod MR, severe TR, pHTN present with PASP 56mmHg, trivial pericardial effusion.  -structural consulted for severe TR   -CCTA on admission showing non obstrucitve CAD   -s/p successful DCCV today-- patient will stay overnight for obs

## 2024-02-06 NOTE — DISCHARGE NOTE PROVIDER - CARE PROVIDERS DIRECT ADDRESSES
,[email or web address]_Unknown,[email or web address]

## 2024-02-06 NOTE — PROGRESS NOTE ADULT - PROBLEM SELECTOR PLAN 3
- Drinks ~3 glasses of wine/day; last drink > 24hrs ago.   - No signs of withdrawal at this time; monitor CIWA.

## 2024-02-06 NOTE — DISCHARGE NOTE NURSING/CASE MANAGEMENT/SOCIAL WORK - NSDCVIVACCINE_GEN_ALL_CORE_FT
influenza, injectable, quadrivalent, preservative free; 09-Oct-2015 14:54; Pepper Graham (RN); [employer]; [id number]; IntraMuscular; Deltoid Left.; 0.5 milliLiter(s); VIS (VIS Published: 07-Aug-2015, VIS Presented: 09-Oct-2015);

## 2024-02-06 NOTE — CONSULT NOTE ADULT - SUBJECTIVE AND OBJECTIVE BOX
HPI:    This is a 73 year old female with Leung's esophagus (diagnosed in 2015, currently on daily PPI), ulcerative colitis (diagnosed 2023, currently on mesalamine 1.2g twice daily), atrial fibrillation (rate controlled with metoprolol, anticoagulated with apixaban), and moderate mitral regurgitation with severe tricuspid regurgitation who gastroenterology has been consulted to evaluate the safety of continuing anticoagulation in a patient with a history of Leung's esophagus. Patient originally presented to the [facility] emergency department 2/2/2024 with 7 days of dyspnea. Upon presentation she was admitted secondary to atrial fibrillation with plans for cardioversion tentatively 2/6/24. Patient describes remaining adherent with her outpatient medications including anticoagulation with apixaban, metoprolol, and pantoprazole. She was originally diagnosed with Leung's esophagus in the fall of 2015 and describes obtaining yearly EGD. Denies dysphagia, odynophagia, abdominal pain, nausea, vomiting, constipation, diarrhea, melena, or hematochezia.     PAST MEDICAL & SURGICAL HISTORY:  Anxiety      Depression      Leung esophagus      Chronic atrial fibrillation      Colitis      Alcohol use      History of migraine headaches      Insomnia, unspecified      No significant past surgical history        Home Medications:  atorvastatin 40 mg oral tablet: 1 tab(s) orally once a day (at bedtime) (02 Feb 2024 23:30)  buPROPion 24 hour extended release: 300 milligram(s) orally once a day (02 Feb 2024 23:32)  citalopram 40 mg oral tablet: 1 tab(s) orally once a day (02 Feb 2024 23:32)  Eliquis 5 mg oral tablet: 1 tab(s) orally every 12 hours (02 Feb 2024 23:30)  gabapentin 300 mg oral capsule: 1 cap(s) orally 3 times a day (02 Feb 2024 23:31)  levothyroxine 112 mcg (0.112 mg) oral capsule: 1 cap(s) orally once a day (02 Feb 2024 23:30)  LORazepam 0.5 mg oral tablet: 1 tab(s) orally once a day as needed for  anxiety (02 Feb 2024 23:32)  mesalamine 1.2 g oral delayed release tablet: 2 tab(s) orally once a day (02 Feb 2024 23:30)  metoprolol succinate 25 mg oral capsule, extended release: 1 cap(s) orally once a day (02 Feb 2024 23:30)  naproxen 250 mg oral tablet: 1 tab(s) orally once a day (02 Feb 2024 23:31)  pantoprazole 40 mg oral delayed release tablet: 1 tab(s) orally once a day (02 Feb 2024 23:30)  SUMAtriptan 50 mg oral tablet: 1 tab(s) orally once a day as needed for  headache (02 Feb 2024 23:32)  zolpidem 10 mg oral tablet: 1 tab(s) orally once a day (at bedtime) (02 Feb 2024 23:30)    Allergies    shellfish (Flushing; Short breath; Urticaria)  No Known Drug Allergies  bee venom (Unknown)    Intolerances      SOCIAL HISTORY:  Denies tobacco use  Denies alcohol use  Denies drug use    FAMILY HISTORY:  Family history of lung cancer (Mother)      MEDICATIONS  (STANDING):  aspirin enteric coated 81 milliGRAM(s) Oral daily  atorvastatin 40 milliGRAM(s) Oral at bedtime  buPROPion XL (24-Hour) . 300 milliGRAM(s) Oral daily  citalopram 40 milliGRAM(s) Oral daily  gabapentin 300 milliGRAM(s) Oral three times a day  heparin  Infusion.  Unit(s)/Hr (17 mL/Hr) IV Continuous <Continuous>  levothyroxine 112 MICROGram(s) Oral daily  mesalamine DR Capsule 1200 milliGRAM(s) Oral every 12 hours  metoprolol tartrate 50 milliGRAM(s) Oral two times a day  pantoprazole    Tablet 40 milliGRAM(s) Oral before breakfast  potassium chloride    Tablet ER 20 milliEquivalent(s) Oral once    MEDICATIONS  (PRN):  heparin   Injectable 3500 Unit(s) IV Push every 6 hours PRN For aPTT between 40 - 57  heparin   Injectable 7500 Unit(s) IV Push every 6 hours PRN For aPTT less than 40  SUMAtriptan 50 milliGRAM(s) Oral daily PRN for headache  zolpidem 5 milliGRAM(s) Oral at bedtime PRN Insomnia  zolpidem 5 milliGRAM(s) Oral at bedtime PRN Insomnia      REVIEW OF SYSTEMS:  All 14 review of systems are negative except as noted in HPI.    Vital Signs Last 24 Hrs  T(C): 36.5 (06 Feb 2024 08:57), Max: 37 (05 Feb 2024 21:22)  T(F): 97.7 (06 Feb 2024 08:57), Max: 98.6 (05 Feb 2024 21:22)  HR: 73 (06 Feb 2024 08:57) (69 - 99)  BP: 126/66 (06 Feb 2024 08:57) (122/66 - 141/70)  BP(mean): 94 (06 Feb 2024 08:57) (89 - 99)  RR: 18 (06 Feb 2024 08:57) (18 - 18)  SpO2: 93% (06 Feb 2024 08:57) (93% - 99%)    Parameters below as of 06 Feb 2024 08:57  Patient On (Oxygen Delivery Method): room air        02-05 @ 07:01  -  02-06 @ 07:00  --------------------------------------------------------  IN: 0 mL / OUT: 0 mL / NET: 0 mL    02-06 @ 07:01  -  02-06 @ 09:40  --------------------------------------------------------  IN: 0 mL / OUT: 0 mL / NET: 0 mL        PHYSICAL EXAM:    General: Elderly appearing  female in bed asleep well developed, well nourished, in no acute distress  Eyes: Anicteric sclerae, moist conjunctivae, extraocular motions intact, pupils equal round and reactive to light  HENT: Pink and moist mucous membranes, good dentition, tongue normal in appearance without lesions and has symmetrical movement, no obvious oral lesions noted, pharynx normal without tonsillar swelling or exudates  Neck: Trachea midline, supple, no obvious lymphadenopathy  Chest: Symmetric appearing, non tender to palpation  Cardiovascular: Regular rate and rhythm, no obvious murmur rub or gallop  Respiratory: Normal respiratory effort, clear lung sounds in anterior and posterior posts bilaterally, no obvious rales ronchi or wheeze  Abdomen: Symmetric appearing, no obvious lesions, non-distended, normal bowel sounds, soft, non-tender to palpation, no rebound or guarding  Extremities: Normal range of motion, no clubbing cyanosis or edema  Neurological: Alert alert and oriented to person place time and situation  Skin: Warm and dry, no obvious rash, no jaundice    LABS:                        11.0   5.06  )-----------( 160      ( 06 Feb 2024 04:35 )             32.2     02-06    134<L>  |  99  |  8   ----------------------------<  103<H>  3.8   |  25  |  0.72    Ca    9.2      06 Feb 2024 04:35  Mg     2.1     02-06    TPro  6.4  /  Alb  3.8  /  TBili  0.8  /  DBili  x   /  AST  44<H>  /  ALT  30  /  AlkPhos  84  02-05        PTT - ( 06 Feb 2024 04:35 )  PTT:65.8 sec    Urinalysis with Rflx Culture (collected 05 Feb 2024 15:56)      RADIOLOGY & ADDITIONAL STUDIES:

## 2024-02-06 NOTE — DISCHARGE NOTE PROVIDER - HOSPITAL COURSE
73-year-old female patient with PMHx daily alcohol use (no hx hospitalizations/withdrawal), Leung's Esophagus, Colitis (on Mesalamine), Afib (on Eliquis), spinal stenosis, migraine headaches, anxiety/depression, anemia (unknown baseline hgb, required transfusion 2015), who presented to [facility] reporting two days of shortness of breath. Two days ago she was walking her dog as usual when she began experiencing shortness of breath which resolved with rest. Patient reports that last night while walking her dog she again felt short of breath with associated midsternal, constant chest pressure that also resolved with rest. Patient reports that today symptoms had resolved but the episode concerned her, prompting her to report to ED for evaluation. Patient is pain-free at this time and feels as though "[she] could get up and walk out." Patient denies current CP, current SOB, dizziness, palpitations, orthopnea/PND, leg swelling, LOC, bleeding, melena/hematochezia, fever, chills, URI symptoms, recent illness, recent ill contacts, recent prolonged travel, personal/family history of blood clots/clotting disorders, hx recent trauma/hospitalization/surgery, use of hormonal replacement therapy, or family hx of early CAD/sudden cardiac death. Patient denies sedentary lifestyle, states that she walks dog three times per day. Follows with cardiologist regularly; stress test within last two months reportedly normal per pt.        -TTE 2/3/24: normal LVSF, mildly dilated RV, normal RVSF, severe biatrial enlargement, mod MR, severe TR, pHTN present PASP 56mmHg, no effusion.   Structural consulted for severe TR with recs  to follow up with Dr. Mittal in 1 week for outpatient ERASTO     CCTA 2/5/24: Calcium score moderate at 105, non obstructive CAD, no left atrial or left atrial appendage thrombus.     -GI consulted 2/2 hx of barrets esophagus and colitis-- cleared patient for continued AC use   -EP consulted for AFIB DCCV   -CIWA negative during admissino     Lopressor 50mg PO BID (in case patient needs [place] following CCTA).       Levothyroxine 112mcg PO QD.     Citalopram 40 mg QD and Bupropion 300 mg QD.  Mesalamine 1200mg PO BID.  Pantoprazole 40mg PO QD.      - CONT: Gabapentin 300 mg TID   - HOLD: Naproxen 250 mg BID.     Problem/Plan - 8:  ·  Problem: History of migraine headaches.   ·  Plan: - CONT: Sumatriptan 50 mg QD PRN     ## INSOMNIA  -CONT: Ambien 10mg PO QHS PRN.     Problem/Plan - 9:  ·  Problem: HLD (hyperlipidemia).   ·  Plan: - LDL 52.   - CONT: Atorvastatin 40mg PO QD      DVT ppx: Eliquis 5 mg BID  F: tolerating PO  E: Keep K > 4, Mg > 2  N: DASH/TLC w/ CC     Code: Full  Dispo: pending clinical progression.     73-year-old female patient with PMHx daily alcohol use (no hx hospitalizations/withdrawal), Leung's Esophagus, Colitis (on Mesalamine), Afib (on Eliquis), spinal stenosis, migraine headaches, anxiety/depression, anemia (unknown baseline hgb, required transfusion 2015), who presented to [facility] reporting two days of shortness of breath. Two days ago she was walking her dog as usual when she began experiencing shortness of breath which resolved with rest. Patient reports that last night while walking her dog she again felt short of breath with associated midsternal, constant chest pressure that also resolved with rest. Patient reports that today symptoms had resolved but the episode concerned her, prompting her to report to ED for evaluation. Patient is pain-free at this time and feels as though "[she] could get up and walk out." Patient denies current CP, current SOB, dizziness, palpitations, orthopnea/PND, leg swelling, LOC, bleeding, melena/hematochezia, fever, chills, URI symptoms, recent illness, recent ill contacts, recent prolonged travel, personal/family history of blood clots/clotting disorders, hx recent trauma/hospitalization/surgery, use of hormonal replacement therapy, or family hx of early CAD/sudden cardiac death. Patient denies sedentary lifestyle, states that she walks dog three times per day. Follows with cardiologist regularly; stress test within last two months reportedly normal per pt.        -TTE 2/3/24: normal LVSF, mildly dilated RV, normal RVSF, severe biatrial enlargement, mod MR, severe TR, pHTN present PASP 56mmHg, no effusion.   Structural consulted for severe TR with recs  to follow up with Dr. Mittal in 1 week for outpatient ERASTO     CCTA 2/5/24: Calcium score moderate at 105, non obstructive CAD, no left atrial or left atrial appendage thrombus.     -GI consulted 2/2 hx of barrets esophagus and colitis-- cleared patient for continued AC use   -EP consulted for AFIB DCCV   -CIWA negative during admission             DISCHARGE MEDS:     Lopressor 50mg PO BID   Eliquis 5mg BID   Levothyroxine 112mcg PO QD  Citalopram 40 mg QD and Bupropion 300 mg QD.  Mesalamine 1200mg PO BID.  Pantoprazole 40mg PO QD.  Gabapentin 300 mg TID   Naproxen 250 mg BID.  Sumatriptan 50 mg QD PRN   Ambien 10mg PO QHS PRN.  Atorvastatin 40mg PO QD         73-year-old female patient with PMHx daily alcohol use (no hx hospitalizations/withdrawal), Leung's Esophagus, Colitis (on Mesalamine), Afib (on Eliquis), spinal stenosis, migraine headaches, anxiety/depression, anemia (unknown baseline hgb, required transfusion 2015), who presented to [facility] reporting two days of shortness of breath. Two days ago she was walking her dog as usual when she began experiencing shortness of breath which resolved with rest. Patient reports that last night while walking her dog she again felt short of breath with associated midsternal, constant chest pressure that also resolved with rest. Patient reports that today symptoms had resolved but the episode concerned her, prompting her to report to ED for evaluation. Patient is pain-free at this time and feels as though "[she] could get up and walk out." Patient denies current CP, current SOB, dizziness, palpitations, orthopnea/PND, leg swelling, LOC, bleeding, melena/hematochezia, fever, chills, URI symptoms, recent illness, recent ill contacts, recent prolonged travel, personal/family history of blood clots/clotting disorders, hx recent trauma/hospitalization/surgery, use of hormonal replacement therapy, or family hx of early CAD/sudden cardiac death. Patient denies sedentary lifestyle, states that she walks dog three times per day. Follows with cardiologist regularly; stress test within last two months reportedly normal per pt.        -TTE 2/3/24: normal LVSF, mildly dilated RV, normal RVSF, severe biatrial enlargement, mod MR, severe TR, pHTN present PASP 56mmHg, no effusion.   Structural consulted for severe TR with recs  to follow up with Dr. Mittal in 1 week for outpatient ERASTO     CCTA 2/5/24: Calcium score moderate at 105, non obstructive CAD, no left atrial or left atrial appendage thrombus.     -GI consulted 2/2 hx of barrets esophagus and colitis-- cleared patient for continued AC use   -EP consulted for AFIB DCCV   -CIWA negative during admission     2/6/24: s/p successful DCCV.     Pt is asymptomatic at this time and denies chest pain, SOB, QUIROS, palpitations, dizziness, LOC, N/V, diaphoresis, orthopnea/PND, and leg swelling. Pt able to ambulate and void without complication. VSS. Labs and telemetry reviewed. Pt is a candidate for discharge per Dr. Gaines. Pt given appropriate discharge instructions, pt states they have an appropriate amount of their previous home meds unchanged from this visit at home, and any new medications were sent to their pharmacy. Pt instructed to f/u with Dr. Alvarado in 1-2 weeks.        DISCHARGE MEDS:     Lopressor 50mg PO BID   Eliquis 5mg BID   Levothyroxine 112mcg PO QD  Citalopram 40 mg QD and Bupropion 300 mg QD.  Mesalamine 1200mg PO BID.  Pantoprazole 40mg PO QD.  Gabapentin 300 mg TID   Naproxen 250 mg BID.  Sumatriptan 50 mg QD PRN   Ambien 10mg PO QHS PRN.  Atorvastatin 40mg PO QD         73-year-old female patient with PMHx daily alcohol use (no hx hospitalizations/withdrawal), Leung's Esophagus, Colitis (on Mesalamine), Afib (on Eliquis), spinal stenosis, migraine headaches, anxiety/depression, anemia (unknown baseline hgb, required transfusion 2015), who presented to [facility] reporting two days of shortness of breath. Two days ago she was walking her dog as usual when she began experiencing shortness of breath which resolved with rest. Patient reports that last night while walking her dog she again felt short of breath with associated midsternal, constant chest pressure that also resolved with rest. Patient reports that today symptoms had resolved but the episode concerned her, prompting her to report to ED for evaluation. Patient is pain-free at this time and feels as though "[she] could get up and walk out." Patient denies current CP, current SOB, dizziness, palpitations, orthopnea/PND, leg swelling, LOC, bleeding, melena/hematochezia, fever, chills, URI symptoms, recent illness, recent ill contacts, recent prolonged travel, personal/family history of blood clots/clotting disorders, hx recent trauma/hospitalization/surgery, use of hormonal replacement therapy, or family hx of early CAD/sudden cardiac death. Patient denies sedentary lifestyle, states that she walks dog three times per day. Follows with cardiologist regularly; stress test within last two months reportedly normal per pt.        -TTE 2/3/24: normal LVSF, mildly dilated RV, normal RVSF, severe biatrial enlargement, mod MR, severe TR, pHTN present PASP 56mmHg, no effusion.   Structural consulted for severe TR with recs  to follow up with Dr. Mittal in 1 week for outpatient ERASTO     CCTA 2/5/24: Calcium score moderate at 105, non obstructive CAD, no left atrial or left atrial appendage thrombus.     -GI consulted 2/2 hx of barrets esophagus and colitis-- cleared patient for continued AC use   -EP consulted for AFIB DCCV   -CIWA negative during admission     2/6/24: s/p successful DCCV. Post DDCV EKG confirms NSR.     Pt is asymptomatic at this time and denies chest pain, SOB, QUIROS, palpitations, dizziness, LOC, N/V, diaphoresis, orthopnea/PND, and leg swelling. Pt able to ambulate and void without complication. VSS. Labs and telemetry reviewed. Pt is a candidate for discharge per Dr. Gaines. Pt given appropriate discharge instructions, pt states they have an appropriate amount of their previous home meds unchanged from this visit at home, and any new medications were sent to their pharmacy. Pt instructed to f/u with Dr. Alvarado in 1-2 weeks.        DISCHARGE MEDS:   atorvastatin 40 mg oral tablet: 1 tab(s) orally once a day (at bedtime)  buPROPion 24 hour extended release: 300 milligram(s) orally once a day  citalopram 40 mg oral tablet: 1 tab(s) orally once a day  Eliquis 5 mg oral tablet: 1 tab(s) orally every 12 hours  gabapentin 300 mg oral capsule: 1 cap(s) orally 3 times a day  levothyroxine 112 mcg (0.112 mg) oral capsule: 1 cap(s) orally once a day  LORazepam 0.5 mg oral tablet: 1 tab(s) orally once a day as needed for  anxiety  mesalamine 1.2 g oral delayed release tablet: 2 tab(s) orally once a day  Metoprolol Tartrate 50 mg oral tablet: 1 tab(s) orally 2 times a day  naproxen 250 mg oral tablet: 1 tab(s) orally once a day  pantoprazole 40 mg oral delayed release tablet: 1 tab(s) orally once a day  SUMAtriptan 50 mg oral tablet: 1 tab(s) orally once a day as needed for  headache  zolpidem 10 mg oral tablet: 1 tab(s) orally once a day (at bedtime         73-year-old female patient with PMHx daily alcohol use (no hx hospitalizations/withdrawal), Leung's Esophagus, Colitis (on Mesalamine), Afib (on Eliquis), spinal stenosis, migraine headaches, anxiety/depression, anemia (unknown baseline hgb, required transfusion 2015), who presented to [facility] reporting two days of shortness of breath. Two days ago she was walking her dog as usual when she began experiencing shortness of breath which resolved with rest. Patient reports that last night while walking her dog she again felt short of breath with associated midsternal, constant chest pressure that also resolved with rest. Patient reports that today symptoms had resolved but the episode concerned her, prompting her to report to ED for evaluation. Patient is pain-free at this time and feels as though "[she] could get up and walk out." Patient denies current CP, current SOB, dizziness, palpitations, orthopnea/PND, leg swelling, LOC, bleeding, melena/hematochezia, fever, chills, URI symptoms, recent illness, recent ill contacts, recent prolonged travel, personal/family history of blood clots/clotting disorders, hx recent trauma/hospitalization/surgery, use of hormonal replacement therapy, or family hx of early CAD/sudden cardiac death. Patient denies sedentary lifestyle, states that she walks dog three times per day. Follows with cardiologist regularly; stress test within last two months reportedly normal per pt.        -TTE 2/3/24: normal LVSF, mildly dilated RV, normal RVSF, severe biatrial enlargement, mod MR, severe TR, pHTN present PASP 56mmHg, no effusion.   Structural consulted for severe TR with recs  to follow up with Dr. Mittal in 1 week for outpatient ERASTO     CCTA 2/5/24: Calcium score moderate at 105, non obstructive CAD, no left atrial or left atrial appendage thrombus.     -GI consulted 2/2 hx of barrets esophagus and colitis-- cleared patient for continued AC use   -EP consulted for AFIB DCCV   -CIWA negative during admission     2/6/24: s/p successful DCCV. Post DDCV EKG confirms NSR.     Pt is asymptomatic at this time and denies chest pain, SOB, QUIROS, palpitations, dizziness, LOC, N/V, diaphoresis, orthopnea/PND, and leg swelling. Pt able to ambulate and void without complication. VSS. Labs and telemetry reviewed. Pt is a candidate for discharge per Dr. Gaines. Pt given appropriate discharge instructions, pt states they have an appropriate amount of their previous home meds unchanged from this visit at home, and any new medications were sent to their pharmacy. Pt instructed to f/u with Dr. Alvarado in 1-2 weeks.        DISCHARGE MEDS:   atorvastatin 40 mg oral tablet: 1 tab(s) orally once a day (at bedtime)  buPROPion 24 hour extended release: 300 milligram(s) orally once a day  citalopram 40 mg oral tablet: 1 tab(s) orally once a day  Eliquis 5 mg oral tablet: 1 tab(s) orally every 12 hours  gabapentin 300 mg oral capsule: 1 cap(s) orally 3 times a day  levothyroxine 112 mcg (0.112 mg) oral capsule: 1 cap(s) orally once a day  LORazepam 0.5 mg oral tablet: 1 tab(s) orally once a day as needed for  anxiety  mesalamine 1.2 g oral delayed release tablet: 2 tab(s) orally once a day  naproxen 250 mg oral tablet: 1 tab(s) orally once a day  pantoprazole 40 mg oral delayed release tablet: 1 tab(s) orally once a day  SUMAtriptan 50 mg oral tablet: 1 tab(s) orally once a day as needed for  headache  zolpidem 10 mg oral tablet: 1 tab(s) orally once a day (at bedtime)           73-year-old female patient with PMHx daily alcohol use (no hx hospitalizations/withdrawal), Leung's Esophagus, Colitis (on Mesalamine), Afib (on Eliquis), spinal stenosis, migraine headaches, anxiety/depression, anemia (unknown baseline hgb, required transfusion 2015), who presented to [facility] reporting two days of shortness of breath. Two days ago she was walking her dog as usual when she began experiencing shortness of breath which resolved with rest. Patient reports that last night while walking her dog she again felt short of breath with associated midsternal, constant chest pressure that also resolved with rest. Patient reports that today symptoms had resolved but the episode concerned her, prompting her to report to ED for evaluation. Patient is pain-free at this time and feels as though "[she] could get up and walk out." Patient denies current CP, current SOB, dizziness, palpitations, orthopnea/PND, leg swelling, LOC, bleeding, melena/hematochezia, fever, chills, URI symptoms, recent illness, recent ill contacts, recent prolonged travel, personal/family history of blood clots/clotting disorders, hx recent trauma/hospitalization/surgery, use of hormonal replacement therapy, or family hx of early CAD/sudden cardiac death. Patient denies sedentary lifestyle, states that she walks dog three times per day. Follows with cardiologist regularly; stress test within last two months reportedly normal per pt.        -TTE 2/3/24: normal LVSF, mildly dilated RV, normal RVSF, severe biatrial enlargement, mod MR, severe TR, pHTN present PASP 56mmHg, no effusion.   Structural consulted for severe TR with recs  to follow up with Dr. Mittal in 1 week for outpatient ERASTO     CCTA 2/5/24: Calcium score moderate at 105, non obstructive CAD, no left atrial or left atrial appendage thrombus.     -GI consulted 2/2 hx of barrets esophagus and colitis-- cleared patient for continued AC use   -EP consulted for AFIB DCCV   -CIWA negative during admission     2/6/24: s/p successful DCCV. Post DDCV EKG confirms NSR. was bradycardic post CVN with episodes of conducted atrial complexes in bigeminy     Pt is asymptomatic at this time and denies chest pain, SOB, QUIROS, palpitations, dizziness, LOC, N/V, diaphoresis, orthopnea/PND, and leg swelling. Pt able to ambulate and void without complication. VSS. Labs and telemetry reviewed. Pt is a candidate for discharge per Dr. Gaines. Pt given appropriate discharge instructions, pt states they have an appropriate amount of their previous home meds unchanged from this visit at home, and any new medications were sent to their pharmacy. Pt instructed to f/u with Dr. Alvarado in 1-2 weeks.        DISCHARGE MEDS:   atorvastatin 40 mg oral tablet: 1 tab(s) orally once a day (at bedtime)  buPROPion 24 hour extended release: 300 milligram(s) orally once a day  citalopram 40 mg oral tablet: 1 tab(s) orally once a day  Eliquis 5 mg oral tablet: 1 tab(s) orally every 12 hours  gabapentin 300 mg oral capsule: 1 cap(s) orally 3 times a day  levothyroxine 112 mcg (0.112 mg) oral capsule: 1 cap(s) orally once a day  LORazepam 0.5 mg oral tablet: 1 tab(s) orally once a day as needed for  anxiety  mesalamine 1.2 g oral delayed release tablet: 2 tab(s) orally once a day  naproxen 250 mg oral tablet: 1 tab(s) orally once a day  pantoprazole 40 mg oral delayed release tablet: 1 tab(s) orally once a day  SUMAtriptan 50 mg oral tablet: 1 tab(s) orally once a day as needed for  headache  zolpidem 10 mg oral tablet: 1 tab(s) orally once a day (at bedtime)

## 2024-02-06 NOTE — DISCHARGE NOTE NURSING/CASE MANAGEMENT/SOCIAL WORK - NSDCPEELIQUISREACT_GEN_ALL_CORE
17
Apixaban/Eliquis increases your risk for bleeding. Notify your doctor if you experience any of the following side effects: bleeding, coughing or vomiting blood, red or black stool, unexpected pain or swelling, itching or hives, chest pain, chest tightness, trouble breathing, changes in how much or how often you urinate, red or pink urine, numbness or tingling in your feet, or unusual muscle weakness. When Apixaban/Eliquis is taken with other medicines, they can affect how it works. Taking other medications such as aspirin, blood thinners, nonsteroidal anti-inflammatories, and medications that treat depression can increase your risk of bleeding. It is very important to tell your health care provider about all of the other medicines, including over-the-counter medications, herbs, and vitamins you are taking. DO NOT start, stop, or change the dosage of any medicine, including over-the-counter medicines, vitamins, and herbal products without your doctor’s approval. Any products containing aspirin or are nonsteroidal anti-inflammatories lessen the blood’s ability to form clots and add to the effect of Apixaban/Eliquis. Never take aspirin or medicines that contain aspirin without speaking to your doctor.

## 2024-02-06 NOTE — DISCHARGE NOTE PROVIDER - PROVIDER TOKENS
PROVIDER:[TOKEN:[[id number]],FOLLOWUP:[1 week]],FREE:[LAST:[Jonathan],FIRST:[Christiano],PHONE:[phone]],FAX:[(   )    -],FOLLOWUP:[1 week],ESTABLISHEDPATIENT:[T]] PROVIDER:[TOKEN:[[id number]],FOLLOWUP:[1 week]],FREE:[LAST:[Jonathan],FIRST:[Christiano],PHONE:[phone]:[(   )    -],FOLLOWUP:[1 week],ESTABLISHEDPATIENT:[T]],PROVIDER:[TOKEN:[[id number]],FOLLOWUP:[1 month]]

## 2024-02-06 NOTE — DISCHARGE NOTE PROVIDER - CARE PROVIDER_API CALL
Darrell Guo  Interventional Cardiology  [address]  [place] [address]  Phone: [phone]  Fax: [phone]  Follow Up Time: 1 week    Christiano Castillo  Phone: [phone]  Fax: (   )    -  Established Patient  Follow Up Time: 1 week   Darrell Guo  Interventional Cardiology  [address]  [place] [address]  Phone: [phone]  Fax: [phone]  Follow Up Time: 1 week    Christiano Castillo  Phone: [phone]  Fax: (   )    -  Established Patient  Follow Up Time: 1 week    Tessa Cardona  Cardiac Electrophysiology  [address] [place] [address]  Phone: [phone]  Fax: [phone]  Follow Up Time: 1 month

## 2024-02-06 NOTE — CONSULT NOTE ADULT - ASSESSMENT
This is a 73 year old female with Leung's esophagus (diagnosed in 2015, currently on daily PPI), ulcerative colitis (diagnosed 2023, currently on mesalamine 1.2g twice daily), atrial fibrillation (rate controlled with metoprolol, anticoagulated with apixaban), and moderate mitral regurgitation with severe tricuspid regurgitation who gastroenterology has been consulted to evaluate the safety of continuing anticoagulation in a patient with a history of Leung's esophagus. Patient describes remaining adherent with her outpatient medications including anticoagulation with apixaban.     #Leung's esophagus  - No obvious contraindication for continuation of anticoagulation.   - She describes remaining adherent with outpatient apixaban anticoagulation without obvious bleeding/melena/hematochezia.   - Continue to monitor for signs of bleeding  - Continue home pantoprazole 40mg orally once daily    #Ulcerative colitis  - Continue home mesalamine 1.2g orally twice daily    DO Zack Sotomayor Advanced Inflammatory Bowel Disease Fellow

## 2024-02-06 NOTE — DISCHARGE NOTE NURSING/CASE MANAGEMENT/SOCIAL WORK - PATIENT PORTAL LINK FT
You can access the FollowMyHealth Patient Portal offered by [facility] by registering at the following website: [email or web address]. By joining [employer]’s FollowMyHealth portal, you will also be able to view your health information using other applications (apps) compatible with our system.

## 2024-02-06 NOTE — DISCHARGE NOTE PROVIDER - NSDCCPCAREPLAN_GEN_ALL_CORE_FT
PRINCIPAL DISCHARGE DIAGNOSIS  Diagnosis: Chest pain  Assessment and Plan of Treatment: You came to the hospital for evaluation of your chest pain, which has since then resolved. You had cardiac enzymes which were negative, revealing no damage to the heart muscle, or a heart attack. You had an Echocardiogram (ultrasound of the heart) showed normal heart function however did reveal Tricupsid Regurgitation. You also had a CT of your heart which revealed no blockages in the arteries of your heart.  Please follow up with Dr. Jonathan Alvarado in 1-2 weeks. If you experience any worsening chest pain, palpitations, dizziness, or shortness of breath, please go to the nearest emergency room.        SECONDARY DISCHARGE DIAGNOSES  Diagnosis: Chronic atrial fibrillation  Assessment and Plan of Treatment: You have an abnormal heart rhythm (arrhythmia) called atrial fibrillation. With this condition, the hearts 2 upper chambers (the atria) quiver rather than squeeze the blood out in a normal pattern. This leads to an irregular and sometimes rapid heartbeat. Atrial fibrillation is serious condition as it affects the heart’s ability to fill with blood as it should and blood clots may form, which increases the risk for stroke.  You underwent cardioversion with our EP team during your admission. We used electricity to put your heart back into a normal rhythm which WAS/WAS NOT SUCCESSFUL_________.  -Please CONTINUE  ELIQUIS 5MG TWICE A DAY to prevent a stroke.  -Please CONTINUE Toprol 25mg daily.   -Please follow up with  _________.      Diagnosis: Severe tricuspid regurgitation  Assessment and Plan of Treatment: Your echocardiogram revealed severe tricuspid reguritaiton. Tricuspid regurgitation, or tricuspid valve regurgitation, is a type of heart valve disease that occurs when the valve's flaps (cusps or leaflets) do not close properly. The tricuspid valve controls the flow of blood from your heart's right atrium (top chamber) to the right ventricle (bottom chamber). Our structural heart team evaluated you during your admission and recommended outpatient follow up.   Please follow up with Dr. Mittal in 1 week by calling to schedule an appointment: [phone].       Diagnosis: History of Leung's esophagus  Assessment and Plan of Treatment: We had our gastroenterology team see your during your admission and cleared you to contnue taking anticoagulation.   Please continue to take pantoprazole 40mg daily. For symptoms of bleeidng such as dark or bright red stools, or vomiting blood, please laury your docotor or visit the closest ER for evaluation.    Diagnosis: Hyperlipidemia  Assessment and Plan of Treatment: Please continue atorvastatin 40mg QHS.    Diagnosis: Anxiety and depression  Assessment and Plan of Treatment: Please continue Bupropion 300mg daily and Citalopram 40mg daily    Diagnosis: Colitis  Assessment and Plan of Treatment: Please continue Mesalamine 1200 mg twice daily.    Diagnosis: Hypothyroid  Assessment and Plan of Treatment: Please continue levothyroxine 112 mcg daily.    Diagnosis: Insomnia  Assessment and Plan of Treatment: Continue ambien as needed    Diagnosis: Spinal stenosis  Assessment and Plan of Treatment: Please continue taking Gabapentin 300mg three times daily and napren 250mg as needed     PRINCIPAL DISCHARGE DIAGNOSIS  Diagnosis: Chest pain  Assessment and Plan of Treatment: You came to the hospital for evaluation of your chest pain, which has since then resolved. You had cardiac enzymes which were negative, revealing no damage to the heart muscle, or a heart attack. You had an Echocardiogram (ultrasound of the heart) showed normal heart function however did reveal Tricupsid Regurgitation. You also had a CT of your heart which revealed no blockages in the arteries of your heart.  Please follow up with Dr. Jonathan Alvarado in 1-2 weeks. If you experience any worsening chest pain, palpitations, dizziness, or shortness of breath, please go to the nearest emergency room.        SECONDARY DISCHARGE DIAGNOSES  Diagnosis: Chronic atrial fibrillation  Assessment and Plan of Treatment: You have an abnormal heart rhythm (arrhythmia) called atrial fibrillation. With this condition, the hearts 2 upper chambers (the atria) quiver rather than squeeze the blood out in a normal pattern. This leads to an irregular and sometimes rapid heartbeat. Atrial fibrillation is serious condition as it affects the heart’s ability to fill with blood as it should and blood clots may form, which increases the risk for stroke.  You underwent cardioversion with our EP team during your admission. We used electricity to put your heart back into a normal rhythm which was successful and now your heart is back in a normal sinus rhythm.  -Please CONTINUE  ELIQUIS 5MG TWICE A DAY to prevent a stroke.  -Please CONTINUE Toprol 25mg daily.   -Please follow up with Dr. Cardona in 3-4 weeks by calling to make an appointment: [phone].       Diagnosis: Severe tricuspid regurgitation  Assessment and Plan of Treatment: Your echocardiogram revealed severe tricuspid reguritaiton. Tricuspid regurgitation, or tricuspid valve regurgitation, is a type of heart valve disease that occurs when the valve's flaps (cusps or leaflets) do not close properly. The tricuspid valve controls the flow of blood from your heart's right atrium (top chamber) to the right ventricle (bottom chamber). Our structural heart team evaluated you during your admission and recommended outpatient follow up.   Please follow up with Dr. Mittal in 1 week by calling to schedule an appointment: [phone].       Diagnosis: History of Leung's esophagus  Assessment and Plan of Treatment: We had our gastroenterology team see your during your admission and cleared you to contnue taking anticoagulation.   Please continue to take pantoprazole 40mg daily. For symptoms of bleeidng such as dark or bright red stools, or vomiting blood, please laury your docotor or visit the closest ER for evaluation.    Diagnosis: Hyperlipidemia  Assessment and Plan of Treatment: Please continue atorvastatin 40mg QHS.    Diagnosis: Anxiety and depression  Assessment and Plan of Treatment: Please continue Bupropion 300mg daily and Citalopram 40mg daily    Diagnosis: Colitis  Assessment and Plan of Treatment: Please continue Mesalamine 1200 mg twice daily.    Diagnosis: Hypothyroid  Assessment and Plan of Treatment: Please continue levothyroxine 112 mcg daily.    Diagnosis: Insomnia  Assessment and Plan of Treatment: Continue ambien as needed    Diagnosis: Spinal stenosis  Assessment and Plan of Treatment: Please continue taking Gabapentin 300mg three times daily and napren 250mg as needed     PRINCIPAL DISCHARGE DIAGNOSIS  Diagnosis: Chest pain  Assessment and Plan of Treatment: You came to the hospital for evaluation of your chest pain, which has since then resolved. You had cardiac enzymes which were negative, revealing no damage to the heart muscle, or a heart attack. You had an Echocardiogram (ultrasound of the heart) showed normal heart function however did reveal Tricupsid Regurgitation. You also had a CT of your heart which revealed no blockages in the arteries of your heart.  Please follow up with Dr. Jonathan Alvarado in 1-2 weeks. If you experience any worsening chest pain, palpitations, dizziness, or shortness of breath, please go to the nearest emergency room.        SECONDARY DISCHARGE DIAGNOSES  Diagnosis: Chronic atrial fibrillation  Assessment and Plan of Treatment: You have an abnormal heart rhythm (arrhythmia) called atrial fibrillation. With this condition, the hearts 2 upper chambers (the atria) quiver rather than squeeze the blood out in a normal pattern. This leads to an irregular and sometimes rapid heartbeat. Atrial fibrillation is serious condition as it affects the heart’s ability to fill with blood as it should and blood clots may form, which increases the risk for stroke.  You underwent cardioversion with our EP team during your admission. We used electricity to put your heart back into a normal rhythm which was successful and now your heart is back in a normal sinus rhythm.  -Please CONTINUE  ELIQUIS 5MG TWICE A DAY to prevent a stroke.  -Please CONTINUE Toprol 50mg twice daily.   -Please follow up with Dr. Cardona in 3-4 weeks by calling to make an appointment: [phone].       Diagnosis: Severe tricuspid regurgitation  Assessment and Plan of Treatment: Your echocardiogram revealed severe tricuspid reguritaiton. Tricuspid regurgitation, or tricuspid valve regurgitation, is a type of heart valve disease that occurs when the valve's flaps (cusps or leaflets) do not close properly. The tricuspid valve controls the flow of blood from your heart's right atrium (top chamber) to the right ventricle (bottom chamber). Our structural heart team evaluated you during your admission and recommended outpatient follow up.   Please follow up with Dr. Mittal in 1 week by calling to schedule an appointment: [phone].       Diagnosis: History of Leung's esophagus  Assessment and Plan of Treatment: We had our gastroenterology team see your during your admission and cleared you to contnue taking anticoagulation.   Please continue to take pantoprazole 40mg daily. For symptoms of bleeidng such as dark or bright red stools, or vomiting blood, please laury your docotor or visit the closest ER for evaluation.    Diagnosis: Hyperlipidemia  Assessment and Plan of Treatment: Please continue atorvastatin 40mg QHS.    Diagnosis: Anxiety and depression  Assessment and Plan of Treatment: Please continue Bupropion 300mg daily and Citalopram 40mg daily    Diagnosis: Colitis  Assessment and Plan of Treatment: Please continue Mesalamine 1200 mg twice daily.    Diagnosis: Hypothyroid  Assessment and Plan of Treatment: Please continue levothyroxine 112 mcg daily.    Diagnosis: Insomnia  Assessment and Plan of Treatment: Continue ambien as needed    Diagnosis: Spinal stenosis  Assessment and Plan of Treatment: Please continue taking Gabapentin 300mg three times daily and napren 250mg as needed     PRINCIPAL DISCHARGE DIAGNOSIS  Diagnosis: Chest pain  Assessment and Plan of Treatment: You came to the hospital for evaluation of your chest pain, which has since then resolved. You had cardiac enzymes which were negative, revealing no damage to the heart muscle, or a heart attack. You had an Echocardiogram (ultrasound of the heart) showed normal heart function however did reveal Tricupsid Regurgitation. You also had a CT of your heart which revealed no blockages in the arteries of your heart.  Please follow up with Dr. Jonathan Alvarado in 1-2 weeks. If you experience any worsening chest pain, palpitations, dizziness, or shortness of breath, please go to the nearest emergency room.        SECONDARY DISCHARGE DIAGNOSES  Diagnosis: Chronic atrial fibrillation  Assessment and Plan of Treatment: You have an abnormal heart rhythm (arrhythmia) called atrial fibrillation. With this condition, the hearts 2 upper chambers (the atria) quiver rather than squeeze the blood out in a normal pattern. This leads to an irregular and sometimes rapid heartbeat. Atrial fibrillation is serious condition as it affects the heart’s ability to fill with blood as it should and blood clots may form, which increases the risk for stroke.  You underwent cardioversion with our EP team during your admission. We used electricity to put your heart back into a normal rhythm which was successful and now your heart is back in a normal sinus rhythm.  -Please CONTINUE  ELIQUIS 5MG TWICE A DAY to prevent a stroke.  -Please STOP Toprol 50mg twice daily and follow up with your cardiologist Dr. Alvarado.   -Please follow up with Dr. Cardona in 3-4 weeks by calling to make an appointment: [phone].       Diagnosis: Severe tricuspid regurgitation  Assessment and Plan of Treatment: Your echocardiogram revealed severe tricuspid reguritaiton. Tricuspid regurgitation, or tricuspid valve regurgitation, is a type of heart valve disease that occurs when the valve's flaps (cusps or leaflets) do not close properly. The tricuspid valve controls the flow of blood from your heart's right atrium (top chamber) to the right ventricle (bottom chamber). Our structural heart team evaluated you during your admission and recommended outpatient follow up.   Please follow up with Dr. Mittal in 1 week by calling to schedule an appointment: [phone].       Diagnosis: History of Leung's esophagus  Assessment and Plan of Treatment: We had our gastroenterology team see your during your admission and cleared you to contnue taking anticoagulation.   Please continue to take pantoprazole 40mg daily. For symptoms of bleeidng such as dark or bright red stools, or vomiting blood, please laury your docotor or visit the closest ER for evaluation.    Diagnosis: Hyperlipidemia  Assessment and Plan of Treatment: Please continue atorvastatin 40mg QHS.    Diagnosis: Anxiety and depression  Assessment and Plan of Treatment: Please continue Bupropion 300mg daily and Citalopram 40mg daily    Diagnosis: Colitis  Assessment and Plan of Treatment: Please continue Mesalamine 1200 mg twice daily.    Diagnosis: Hypothyroid  Assessment and Plan of Treatment: Please continue levothyroxine 112 mcg daily.    Diagnosis: Insomnia  Assessment and Plan of Treatment: Continue ambien as needed    Diagnosis: Spinal stenosis  Assessment and Plan of Treatment: Please continue taking Gabapentin 300mg three times daily and napren 250mg as needed

## 2024-02-06 NOTE — DISCHARGE NOTE PROVIDER - NSDCFUSCHEDAPPT_GEN_ALL_CORE_FT
Tessa Cardona  [facility] Physician [facility]  [facility] [address]  Scheduled Appointment: 04/01/2024

## 2024-02-06 NOTE — CONSULT NOTE ADULT - ATTENDING COMMENTS
Patient seen, examined, and discussed with Dr. Akers. Agree with above. 73F with Leung's esophagus (diagnosed in 2015, currently on daily PPI), ulcerative colitis (diagnosed 2023, currently on mesalamine 1.2g twice daily), atrial fibrillation (rate controlled with metoprolol, anticoagulated with apixaban), and moderate mitral regurgitation with severe tricuspid regurgitation who gastroenterology has been consulted to evaluate the safety of continuing anticoagulation in a patient with a history of Leung's esophagus. Patient describes remaining adherent with her outpatient medications including anticoagulation with apixaban. There is no contraindication to anticoagulation given her Leung's esophagus. Continue PPI. Continue mesalamine for her UC. Outpatient follow up with Dr. Velazquez. Will sign off, please call back with questions or concerns.     Juana Vann MD  Gastroenterology

## 2024-02-06 NOTE — CHART NOTE - NSCHARTNOTEFT_GEN_A_CORE
73-year-old female patient with PMHx daily alcohol use (no hx hospitalizations/withdrawal), Leung's Esophagus, Colitis (on Mesalamine), Afib (on Eliquis), spinal stenosis, migraine headaches, anxiety/depression, anemia (unknown baseline hgb, required transfusion 2015), who presented to [facility] reporting two days of shortness of breath. Two days ago she was walking her dog as usual when she began experiencing shortness of breath which resolved with rest. Patient reports that last night while walking her dog she again felt short of breath with associated midsternal, constant chest pressure that also resolved with rest. Patient reports that today symptoms had resolved but the episode concerned her, prompting her to report to ED for evaluation. Patient is pain-free at this time and feels as though "[she] could get up and walk out." Patient denies current CP, current SOB, dizziness, palpitations, orthopnea/PND, leg swelling, LOC, bleeding, melena/hematochezia, fever, chills, URI symptoms, recent illness, recent ill contacts, recent prolonged travel, personal/family history of blood clots/clotting disorders, hx recent trauma/hospitalization/surgery, use of hormonal replacement therapy, or family hx of early CAD/sudden cardiac death. Patient denies sedentary lifestyle, states that she walks dog three times per day. Follows with cardiologist regularly; stress test within last two months per pt. Structural heart, Dr. Guo being called for evaluation of MR/TR.     Plan:  Problem 1: Moderate MR and severe TR  -TTE done 2/3/24  -Continue w/ HR/BP control  -F/U results of calcium score  -Cardioversion today with EP   -Recommend ERASTO eventually to better assess the valves, but can be done out-patient.    -per Dr. Guo would also recommend Rt and Left heart cath and heart failure consult/follow up outpatient.   -On discharge, please make a follow up appointment with Dr. Guo, to schedule appointments, call [phone].   -care per primary team     Problem 2: HLD  -Continue statin.  -Care per primary team     Problem 3: Colitis  -Continue w/ home medication  -Care per primary team     Problem 4: Afib  -on heparin gtt.  PTT therapeutic.   -EP will cardioversion today, likely be discharged after.   -ERASTO as a outpt.  -care per primary team     I have reviewed clinical labs tests and reports, radiology tests and reports, as well as old patient medical records, and discussed with the referring physician.
Consulted regarding hx of BE and plan for anticoag following cardioversion, tentatively scheduled for 2/6.  Case discussed with cardiology PA.    In brief, pt is 72 yo F with daily ETOH use, BE on PPI, ? colitis on mesalamine and afib on eliquis admitted for chest pain. Moderate MR and severe TR on TTE done 2/3/24.   On heparin gtt for afib.      Per note, last endoscopy with Dr. Velazquez in May 2023    Prelim Recommendations:  -No absolute contraindication to continued anticoag use with hx of barretts. Reassuringly, has been on anticoag and is currently on heparin  -Monitor for clinical bleeding  -Please obtain EGD report to review to help guide further recommendations    Formal eval in AM at bedside.  D/w attending, Dr. Soo Michelle, DO  Gastroenterology Fellow  Pager: [phone]  After 5PM or on weekends, please contact [place]  for fellow on call

## 2024-02-06 NOTE — PROGRESS NOTE ADULT - PROBLEM SELECTOR PLAN 9
- LDL 52.   - CONT: Atorvastatin 40mg PO QD      F: None  E: Replete if K<4 or Mag<2  N: DASH Diet  GIppx: Pantoprazole   VTEppx: Heparin gtt   Dispo: pending clinical course - LDL 52.   - CONT: Atorvastatin 40mg PO QD      F: None  E: Replete if K<4 or Mag<2  N: DASH Diet  GIppx: Pantoprazole   VTEppx: Eliquis 5mg BID   Dispo: pending clinical course

## 2024-02-06 NOTE — PROGRESS NOTE ADULT - SUBJECTIVE AND OBJECTIVE BOX
Cardiology PA Adult Progress Note    Subjective Assessment: PT seen and examined at bedside s/p success DCCV. Post EKG patient confrimed to be in NSR and patient was set for discharge however tele called saying patient was in junctional rhythm. EKG obtained showing patient to be in NSR with 1st degree AV block and bigeminy PACs. VSS, patient asymptomatic. Patient agreeable to stay overnight for observation and will hold BB this evening. Eliquis will start.      ROS negative except as noted above.  	  MEDICATIONS:        buPROPion XL (24-Hour) . 300 milliGRAM(s) Oral daily  citalopram 40 milliGRAM(s) Oral daily  gabapentin 300 milliGRAM(s) Oral three times a day  SUMAtriptan 50 milliGRAM(s) Oral daily PRN  zolpidem 5 milliGRAM(s) Oral at bedtime PRN  zolpidem 5 milliGRAM(s) Oral at bedtime PRN  mesalamine DR Capsule 1200 milliGRAM(s) Oral every 12 hours  pantoprazole    Tablet 40 milliGRAM(s) Oral before breakfast   atorvastatin 40 milliGRAM(s) Oral at bedtime  levothyroxine 112 MICROGram(s) Oral daily   aspirin enteric coated 81 milliGRAM(s) Oral daily      	    [PHYSICAL EXAM:  TELEMETRY:  T(C): 36.4 (02-06-24 @ 14:02), Max: 37 (02-05-24 @ 21:22)  HR: 61 (02-06-24 @ 18:09) (60 - 82)  BP: 118/65 (02-06-24 @ 18:09) (118/65 - 141/70)  RR: 18 (02-06-24 @ 18:09) (18 - 18)  SpO2: 96% (02-06-24 @ 18:09) (93% - 96%)  Wt(kg): --  I&O's Summary    05 Feb 2024 07:01  -  06 Feb 2024 07:00  --------------------------------------------------------  IN: 0 mL / OUT: 0 mL / NET: 0 mL    06 Feb 2024 07:01 - 06 Feb 2024 19:30  --------------------------------------------------------  IN: 180 mL / OUT: 0 mL / NET: 180 mL      Height (cm): 175.3 (02-06 @ 14:02)  Weight (kg): 95.3 (02-06 @ 14:02)  BMI (kg/m2): 31 (02-06 @ 14:02)  BSA (m2): 2.11 (02-06 @ 14:02)                                        Appearance: Normal	  HEENT:   Normal oral mucosa, PERRLA, EOMI	  Neck: Supple, - JVD; Carotid Bruit   Cardiovascular: Normal S1 S2, No JVD, No murmurs,   Respiratory: Lungs clear to auscultation  Gastrointestinal:  Soft, Non-tender, + BS	  Skin: No rashes, No ecchymoses, No cyanosis  Extremities: Normal range of motion, No clubbing, cyanosis or edema  Vascular: Peripheral pulses palpable 2+ bilaterally  Neurologic: Non-focal  Psychiatry: A & O x 3, Mood & affect appropriate      	    ECG:  	  RADIOLOGY:   DIAGNOSTIC TESTING:  [ ] Echocardiogram:   [ ]  Catheterization:  [ ] Stress Test:    [ ] ERASTO  OTHER: 	    LABS:	 	  CARDIAC MARKERS:                                  11.0   5.06  )-----------( 160      ( 06 Feb 2024 04:35 )             32.2     02-06    134<L>  |  99  |  8   ----------------------------<  103<H>  3.8   |  25  |  0.72    Ca    9.2      06 Feb 2024 04:35  Mg     2.1     02-06    TPro  6.4  /  Alb  3.8  /  TBili  0.8  /  DBili  x   /  AST  44<H>  /  ALT  30  /  AlkPhos  84  02-05    proBNP:   Lipid Profile:   HgA1c:   TSH:   PTT - ( 06 Feb 2024 11:13 )  PTT:61.4 sec Cardiology PA Adult Progress Note    Subjective Assessment: PT seen and examined at bedside s/p success DCCV. Post EKG patient confirmed to be in NSR and patient was set for discharge however tele called saying patient was in junctional rhythm. EKG obtained showing patient to be in NSR with 1st degree AV block with PACs. VSS, patient asymptomatic. Patient agreeable to stay overnight for observation and will hold BB this evening. Eliquis will resume this evening.     ROS negative except as noted above.  	  MEDICATIONS:      buPROPion XL (24-Hour) . 300 milliGRAM(s) Oral daily  citalopram 40 milliGRAM(s) Oral daily  gabapentin 300 milliGRAM(s) Oral three times a day  SUMAtriptan 50 milliGRAM(s) Oral daily PRN  zolpidem 5 milliGRAM(s) Oral at bedtime PRN  zolpidem 5 milliGRAM(s) Oral at bedtime PRN  mesalamine DR Capsule 1200 milliGRAM(s) Oral every 12 hours  pantoprazole    Tablet 40 milliGRAM(s) Oral before breakfast   atorvastatin 40 milliGRAM(s) Oral at bedtime  levothyroxine 112 MICROGram(s) Oral daily   aspirin enteric coated 81 milliGRAM(s) Oral daily      	    [PHYSICAL EXAM:  TELEMETRY:  T(C): 36.4 (02-06-24 @ 14:02), Max: 37 (02-05-24 @ 21:22)  HR: 61 (02-06-24 @ 18:09) (60 - 82)  BP: 118/65 (02-06-24 @ 18:09) (118/65 - 141/70)  RR: 18 (02-06-24 @ 18:09) (18 - 18)  SpO2: 96% (02-06-24 @ 18:09) (93% - 96%)  Wt(kg): --  I&O's Summary    05 Feb 2024 07:01  -  06 Feb 2024 07:00  --------------------------------------------------------  IN: 0 mL / OUT: 0 mL / NET: 0 mL    06 Feb 2024 07:01  -  06 Feb 2024 19:30  --------------------------------------------------------  IN: 180 mL / OUT: 0 mL / NET: 180 mL      Height (cm): 175.3 (02-06 @ 14:02)  Weight (kg): 95.3 (02-06 @ 14:02)  BMI (kg/m2): 31 (02-06 @ 14:02)  BSA (m2): 2.11 (02-06 @ 14:02)                                        Appearance: Normal	  HEENT:   Normal oral mucosa, PERRLA, EOMI	  Neck: Supple, - JVD; Carotid Bruit   Cardiovascular: Normal S1 S2, No JVD, No murmurs,   Respiratory: Lungs clear to auscultation  Gastrointestinal:  Soft, Non-tender, + BS	  Skin: No rashes, No ecchymoses, No cyanosis  Extremities: Normal range of motion, No clubbing, cyanosis or edema  Vascular: Peripheral pulses palpable 2+ bilaterally  Neurologic: Non-focal  Psychiatry: A & O x 3, Mood & affect appropriate      	    ECG:  	  RADIOLOGY:   DIAGNOSTIC TESTING:  [ ] Echocardiogram:   [ ]  Catheterization:  [ ] Stress Test:    [ ] ERASTO  OTHER: 	    LABS:	 	  CARDIAC MARKERS:                                  11.0   5.06  )-----------( 160      ( 06 Feb 2024 04:35 )             32.2     02-06    134<L>  |  99  |  8   ----------------------------<  103<H>  3.8   |  25  |  0.72    Ca    9.2      06 Feb 2024 04:35  Mg     2.1     02-06    TPro  6.4  /  Alb  3.8  /  TBili  0.8  /  DBili  x   /  AST  44<H>  /  ALT  30  /  AlkPhos  84  02-05    proBNP:   Lipid Profile:   HgA1c:   TSH:   PTT - ( 06 Feb 2024 11:13 )  PTT:61.4 sec

## 2024-02-06 NOTE — DISCHARGE NOTE PROVIDER - NSDCFUADDINST_GEN_ALL_CORE_FT
Please continue to follow a heart healthy diet, low in sodium, cholesterol and fats. We recommend a Mediterranean diet:     -Incorporate 2 or more servings per day of vegetables, fruits, and whole grains     -Increase intake of fish and legumes/beans to 2 or more servings per week     -Increase intake of healthy fats, such as olive oil and avocados, and have a handful of nuts/seeds most days     -Reduce red/processed meat consumption to 2 or fewer times per week

## 2024-02-06 NOTE — DISCHARGE NOTE NURSING/CASE MANAGEMENT/SOCIAL WORK - NSDCPEFALRISK_GEN_ALL_CORE
For information on Fall & Injury Prevention, visit: [email or web address] OR  [email or web address] OR  https://www.cdc.gov/steadi/patient.html

## 2024-02-06 NOTE — PROGRESS NOTE ADULT - PROBLEM SELECTOR PLAN 2
- Recently Dx w/ AFib 10/2023; currently AFib w/ HR 80s.   - CONT: Heparin gtt and Lopressor 50mg PO BID (in case patient needs LHC following CCTA) - Recently Dx w/ AFib 10/2023; currently AFib w/ HR 80s.   - s/p successful DCCV today with EP, patient will resume Eliquis and HOLD BB 2/2 post DCCV EKG showing 1st deg AVB with PACs

## 2024-02-06 NOTE — PROGRESS NOTE ADULT - PROBLEM/PLAN-8
Spoke with mom - given instructions per Dr Patel - mom will call Monday AM if wants to cancel appt .  
DISPLAY PLAN FREE TEXT

## 2024-02-07 ENCOUNTER — NON-APPOINTMENT (OUTPATIENT)
Age: 74
End: 2024-02-07

## 2024-02-07 VITALS
OXYGEN SATURATION: 96 % | HEART RATE: 57 BPM | TEMPERATURE: 98 F | SYSTOLIC BLOOD PRESSURE: 124 MMHG | RESPIRATION RATE: 18 BRPM | DIASTOLIC BLOOD PRESSURE: 59 MMHG

## 2024-02-07 PROBLEM — G47.00 INSOMNIA, UNSPECIFIED: Chronic | Status: ACTIVE | Noted: 2024-02-03

## 2024-02-07 PROBLEM — K22.70 BARRETT'S ESOPHAGUS WITHOUT DYSPLASIA: Chronic | Status: ACTIVE | Noted: 2024-02-02

## 2024-02-07 PROBLEM — Z78.9 OTHER SPECIFIED HEALTH STATUS: Chronic | Status: ACTIVE | Noted: 2024-02-03

## 2024-02-07 PROBLEM — Z86.69 PERSONAL HISTORY OF OTHER DISEASES OF THE NERVOUS SYSTEM AND SENSE ORGANS: Chronic | Status: ACTIVE | Noted: 2024-02-03

## 2024-02-07 LAB
ANION GAP SERPL CALC-SCNC: 10 MMOL/L — SIGNIFICANT CHANGE UP (ref 5–17)
APTT BLD: 39.8 SEC — HIGH (ref 24.5–35.6)
BILIRUB SERPL-MCNC: 0.8 MG/DL — SIGNIFICANT CHANGE UP (ref 0.2–1.2)
BUN SERPL-MCNC: 12 MG/DL — SIGNIFICANT CHANGE UP (ref 7–23)
CALCIUM SERPL-MCNC: 9.2 MG/DL — SIGNIFICANT CHANGE UP (ref 8.4–10.5)
CHLORIDE SERPL-SCNC: 96 MMOL/L — SIGNIFICANT CHANGE UP (ref 96–108)
CO2 SERPL-SCNC: 23 MMOL/L — SIGNIFICANT CHANGE UP (ref 22–31)
CREAT SERPL-MCNC: 0.84 MG/DL — SIGNIFICANT CHANGE UP (ref 0.5–1.3)
EGFR: 73 ML/MIN/1.73M2 — SIGNIFICANT CHANGE UP
GLUCOSE SERPL-MCNC: 107 MG/DL — HIGH (ref 70–99)
HCT VFR BLD CALC: 31.8 % — LOW (ref 34.5–45)
HGB BLD-MCNC: 10.5 G/DL — LOW (ref 11.5–15.5)
INR BLD: 1.43 — HIGH (ref 0.85–1.18)
MAGNESIUM SERPL-MCNC: 2 MG/DL — SIGNIFICANT CHANGE UP (ref 1.6–2.6)
MCHC RBC-ENTMCNC: 33 GM/DL — SIGNIFICANT CHANGE UP (ref 32–36)
MCHC RBC-ENTMCNC: 34.1 PG — HIGH (ref 27–34)
MCV RBC AUTO: 103.2 FL — HIGH (ref 80–100)
MELD SCORE WITH DIALYSIS: 28 POINTS — SIGNIFICANT CHANGE UP
MELD SCORE WITHOUT DIALYSIS: 10 POINTS — SIGNIFICANT CHANGE UP
NRBC # BLD: 0 /100 WBCS — SIGNIFICANT CHANGE UP (ref 0–0)
PLATELET # BLD AUTO: 180 K/UL — SIGNIFICANT CHANGE UP (ref 150–400)
POTASSIUM SERPL-MCNC: 4.3 MMOL/L — SIGNIFICANT CHANGE UP (ref 3.5–5.3)
POTASSIUM SERPL-SCNC: 4.3 MMOL/L — SIGNIFICANT CHANGE UP (ref 3.5–5.3)
PROTHROM AB SERPL-ACNC: 16.1 SEC — HIGH (ref 9.5–13)
RBC # BLD: 3.08 M/UL — LOW (ref 3.8–5.2)
RBC # FLD: 13.9 % — SIGNIFICANT CHANGE UP (ref 10.3–14.5)
SODIUM SERPL-SCNC: 129 MMOL/L — LOW (ref 135–145)
WBC # BLD: 5.59 K/UL — SIGNIFICANT CHANGE UP (ref 3.8–10.5)
WBC # FLD AUTO: 5.59 K/UL — SIGNIFICANT CHANGE UP (ref 3.8–10.5)

## 2024-02-07 PROCEDURE — 99239 HOSP IP/OBS DSCHRG MGMT >30: CPT

## 2024-02-07 PROCEDURE — 93970 EXTREMITY STUDY: CPT

## 2024-02-07 PROCEDURE — 83880 ASSAY OF NATRIURETIC PEPTIDE: CPT

## 2024-02-07 PROCEDURE — 93010 ELECTROCARDIOGRAM REPORT: CPT

## 2024-02-07 PROCEDURE — 71045 X-RAY EXAM CHEST 1 VIEW: CPT

## 2024-02-07 PROCEDURE — 80053 COMPREHEN METABOLIC PANEL: CPT

## 2024-02-07 PROCEDURE — C8923: CPT

## 2024-02-07 PROCEDURE — 86900 BLOOD TYPING SEROLOGIC ABO: CPT

## 2024-02-07 PROCEDURE — 80048 BASIC METABOLIC PNL TOTAL CA: CPT

## 2024-02-07 PROCEDURE — 83550 IRON BINDING TEST: CPT

## 2024-02-07 PROCEDURE — 81003 URINALYSIS AUTO W/O SCOPE: CPT

## 2024-02-07 PROCEDURE — 84484 ASSAY OF TROPONIN QUANT: CPT

## 2024-02-07 PROCEDURE — 83540 ASSAY OF IRON: CPT

## 2024-02-07 PROCEDURE — 82248 BILIRUBIN DIRECT: CPT

## 2024-02-07 PROCEDURE — 86901 BLOOD TYPING SEROLOGIC RH(D): CPT

## 2024-02-07 PROCEDURE — 80061 LIPID PANEL: CPT

## 2024-02-07 PROCEDURE — 85027 COMPLETE CBC AUTOMATED: CPT

## 2024-02-07 PROCEDURE — 85610 PROTHROMBIN TIME: CPT

## 2024-02-07 PROCEDURE — 83036 HEMOGLOBIN GLYCOSYLATED A1C: CPT

## 2024-02-07 PROCEDURE — 99285 EMERGENCY DEPT VISIT HI MDM: CPT

## 2024-02-07 PROCEDURE — 36415 COLL VENOUS BLD VENIPUNCTURE: CPT

## 2024-02-07 PROCEDURE — 82550 ASSAY OF CK (CPK): CPT

## 2024-02-07 PROCEDURE — 86850 RBC ANTIBODY SCREEN: CPT

## 2024-02-07 PROCEDURE — 84443 ASSAY THYROID STIM HORMONE: CPT

## 2024-02-07 PROCEDURE — 85379 FIBRIN DEGRADATION QUANT: CPT

## 2024-02-07 PROCEDURE — 80307 DRUG TEST PRSMV CHEM ANLYZR: CPT

## 2024-02-07 PROCEDURE — 82746 ASSAY OF FOLIC ACID SERUM: CPT

## 2024-02-07 PROCEDURE — 82607 VITAMIN B-12: CPT

## 2024-02-07 PROCEDURE — 84466 ASSAY OF TRANSFERRIN: CPT

## 2024-02-07 PROCEDURE — 93005 ELECTROCARDIOGRAM TRACING: CPT

## 2024-02-07 PROCEDURE — 83735 ASSAY OF MAGNESIUM: CPT

## 2024-02-07 PROCEDURE — 85730 THROMBOPLASTIN TIME PARTIAL: CPT

## 2024-02-07 PROCEDURE — 82553 CREATINE MB FRACTION: CPT

## 2024-02-07 PROCEDURE — 85025 COMPLETE CBC W/AUTO DIFF WBC: CPT

## 2024-02-07 PROCEDURE — 97161 PT EVAL LOW COMPLEX 20 MIN: CPT

## 2024-02-07 PROCEDURE — 82728 ASSAY OF FERRITIN: CPT

## 2024-02-07 PROCEDURE — 86803 HEPATITIS C AB TEST: CPT

## 2024-02-07 PROCEDURE — 75574 CT ANGIO HRT W/3D IMAGE: CPT

## 2024-02-07 RX ADMIN — PANTOPRAZOLE SODIUM 40 MILLIGRAM(S): 20 TABLET, DELAYED RELEASE ORAL at 06:33

## 2024-02-07 RX ADMIN — GABAPENTIN 300 MILLIGRAM(S): 400 CAPSULE ORAL at 06:33

## 2024-02-07 RX ADMIN — Medication 81 MILLIGRAM(S): at 11:32

## 2024-02-07 RX ADMIN — Medication 112 MICROGRAM(S): at 05:34

## 2024-02-07 RX ADMIN — ZOLPIDEM TARTRATE 5 MILLIGRAM(S): 10 TABLET ORAL at 01:23

## 2024-02-07 RX ADMIN — BUPROPION HYDROCHLORIDE 300 MILLIGRAM(S): 150 TABLET, EXTENDED RELEASE ORAL at 11:32

## 2024-02-07 RX ADMIN — Medication 1200 MILLIGRAM(S): at 07:47

## 2024-02-07 RX ADMIN — APIXABAN 5 MILLIGRAM(S): 2.5 TABLET, FILM COATED ORAL at 06:33

## 2024-02-12 DIAGNOSIS — E03.9 HYPOTHYROIDISM, UNSPECIFIED: ICD-10-CM

## 2024-02-12 DIAGNOSIS — E78.5 HYPERLIPIDEMIA, UNSPECIFIED: ICD-10-CM

## 2024-02-12 DIAGNOSIS — I44.0 ATRIOVENTRICULAR BLOCK, FIRST DEGREE: ICD-10-CM

## 2024-02-12 DIAGNOSIS — K22.70 BARRETT'S ESOPHAGUS WITHOUT DYSPLASIA: ICD-10-CM

## 2024-02-12 DIAGNOSIS — I48.20 CHRONIC ATRIAL FIBRILLATION, UNSPECIFIED: ICD-10-CM

## 2024-02-12 DIAGNOSIS — F10.90 ALCOHOL USE, UNSPECIFIED, UNCOMPLICATED: ICD-10-CM

## 2024-02-12 DIAGNOSIS — R07.89 OTHER CHEST PAIN: ICD-10-CM

## 2024-02-12 DIAGNOSIS — M48.00 SPINAL STENOSIS, SITE UNSPECIFIED: ICD-10-CM

## 2024-02-12 DIAGNOSIS — G47.00 INSOMNIA, UNSPECIFIED: ICD-10-CM

## 2024-02-12 DIAGNOSIS — Z79.890 HORMONE REPLACEMENT THERAPY: ICD-10-CM

## 2024-02-12 DIAGNOSIS — I27.20 PULMONARY HYPERTENSION, UNSPECIFIED: ICD-10-CM

## 2024-02-12 DIAGNOSIS — F32.A DEPRESSION, UNSPECIFIED: ICD-10-CM

## 2024-02-12 DIAGNOSIS — D64.9 ANEMIA, UNSPECIFIED: ICD-10-CM

## 2024-02-12 DIAGNOSIS — Z80.1 FAMILY HISTORY OF MALIGNANT NEOPLASM OF TRACHEA, BRONCHUS AND LUNG: ICD-10-CM

## 2024-02-12 DIAGNOSIS — F41.9 ANXIETY DISORDER, UNSPECIFIED: ICD-10-CM

## 2024-02-12 DIAGNOSIS — Z79.01 LONG TERM (CURRENT) USE OF ANTICOAGULANTS: ICD-10-CM

## 2024-02-12 DIAGNOSIS — Z91.013 ALLERGY TO SEAFOOD: ICD-10-CM

## 2024-02-12 DIAGNOSIS — I08.1 RHEUMATIC DISORDERS OF BOTH MITRAL AND TRICUSPID VALVES: ICD-10-CM

## 2024-02-12 DIAGNOSIS — G43.909 MIGRAINE, UNSPECIFIED, NOT INTRACTABLE, WITHOUT STATUS MIGRAINOSUS: ICD-10-CM

## 2024-02-12 DIAGNOSIS — K51.90 ULCERATIVE COLITIS, UNSPECIFIED, WITHOUT COMPLICATIONS: ICD-10-CM

## 2024-02-12 DIAGNOSIS — I25.10 ATHEROSCLEROTIC HEART DISEASE OF NATIVE CORONARY ARTERY WITHOUT ANGINA PECTORIS: ICD-10-CM

## 2024-02-12 PROBLEM — Z00.00 ENCOUNTER FOR PREVENTIVE HEALTH EXAMINATION: Status: ACTIVE | Noted: 2024-02-12

## 2024-03-08 PROBLEM — K52.9 NONINFECTIVE GASTROENTERITIS AND COLITIS, UNSPECIFIED: Chronic | Status: ACTIVE | Noted: 2024-02-02

## 2024-03-08 PROBLEM — I48.20 CHRONIC ATRIAL FIBRILLATION, UNSPECIFIED: Chronic | Status: ACTIVE | Noted: 2024-02-02

## 2024-04-01 ENCOUNTER — APPOINTMENT (OUTPATIENT)
Dept: HEART AND VASCULAR | Facility: CLINIC | Age: 74
End: 2024-04-01

## 2025-07-02 NOTE — PHYSICAL THERAPY INITIAL EVALUATION ADULT - GENERAL OBSERVATIONS, REHAB EVAL
Pt. received semi supine, +telemetry, +pulse ox, NAD, agreeable to PT. Cleared for mobility by OVI Joy. None